# Patient Record
Sex: MALE | Race: WHITE | NOT HISPANIC OR LATINO | URBAN - METROPOLITAN AREA
[De-identification: names, ages, dates, MRNs, and addresses within clinical notes are randomized per-mention and may not be internally consistent; named-entity substitution may affect disease eponyms.]

---

## 2023-01-19 ENCOUNTER — OFFICE VISIT (OUTPATIENT)
Dept: FAMILY MEDICINE CLINIC | Facility: CLINIC | Age: 28
End: 2023-01-19
Payer: MEDICAID

## 2023-01-19 VITALS
DIASTOLIC BLOOD PRESSURE: 79 MMHG | WEIGHT: 226 LBS | SYSTOLIC BLOOD PRESSURE: 121 MMHG | OXYGEN SATURATION: 96 % | TEMPERATURE: 96.8 F | HEART RATE: 75 BPM | BODY MASS INDEX: 32.35 KG/M2 | HEIGHT: 70 IN

## 2023-01-19 DIAGNOSIS — R53.83 OTHER FATIGUE: ICD-10-CM

## 2023-01-19 DIAGNOSIS — Z13.220 LIPID SCREENING: ICD-10-CM

## 2023-01-19 DIAGNOSIS — E66.09 CLASS 1 OBESITY DUE TO EXCESS CALORIES WITHOUT SERIOUS COMORBIDITY WITH BODY MASS INDEX (BMI) OF 32.0 TO 32.9 IN ADULT: ICD-10-CM

## 2023-01-19 DIAGNOSIS — R41.0 CONFUSION: ICD-10-CM

## 2023-01-19 DIAGNOSIS — Z00.00 PREVENTATIVE HEALTH CARE: Primary | ICD-10-CM

## 2023-01-19 DIAGNOSIS — R41.89 ALTERED THOUGHT PROCESSES: ICD-10-CM

## 2023-01-19 PROBLEM — F41.9 ANXIETY: Status: ACTIVE | Noted: 2023-01-19

## 2023-01-19 PROBLEM — K21.9 GASTROESOPHAGEAL REFLUX DISEASE WITHOUT ESOPHAGITIS: Status: ACTIVE | Noted: 2023-01-19

## 2023-01-19 PROBLEM — E66.811 CLASS 1 OBESITY DUE TO EXCESS CALORIES WITHOUT SERIOUS COMORBIDITY WITH BODY MASS INDEX (BMI) OF 32.0 TO 32.9 IN ADULT: Status: ACTIVE | Noted: 2023-01-19

## 2023-01-19 PROBLEM — K58.2 IRRITABLE BOWEL SYNDROME WITH BOTH CONSTIPATION AND DIARRHEA: Status: ACTIVE | Noted: 2023-01-19

## 2023-01-19 PROBLEM — K25.9 MULTIPLE GASTRIC ULCERS: Status: ACTIVE | Noted: 2023-01-19

## 2023-01-19 PROCEDURE — T1015 CLINIC SERVICE: HCPCS | Performed by: NURSE PRACTITIONER

## 2023-01-19 PROCEDURE — 99203 OFFICE O/P NEW LOW 30 MIN: CPT | Performed by: NURSE PRACTITIONER

## 2023-01-19 RX ORDER — LEVOCETIRIZINE DIHYDROCHLORIDE 5 MG/1
5 TABLET, FILM COATED ORAL EVERY EVENING
COMMUNITY

## 2023-01-19 RX ORDER — PAROXETINE HYDROCHLORIDE 20 MG/1
20 TABLET, FILM COATED ORAL DAILY
COMMUNITY
Start: 2023-01-10

## 2023-01-19 RX ORDER — HYDROXYZINE 50 MG/1
50 TABLET, FILM COATED ORAL 2 TIMES DAILY PRN
COMMUNITY
Start: 2022-10-30

## 2023-01-19 RX ORDER — PANTOPRAZOLE SODIUM 40 MG/1
40 TABLET, DELAYED RELEASE ORAL DAILY
COMMUNITY
Start: 2022-12-23

## 2023-01-19 NOTE — PATIENT INSTRUCTIONS
CT of the head without  Fasting blood work  Stop smoking marijuana  Try hydroxyzine at bedtime for sleep  Follow-up 3 months

## 2023-01-19 NOTE — PROGRESS NOTES
Raj Cabrera is a 27 y.o. male.     History of Present Illness  27-year-old white male who recently quit smoking with history of GERD, anxiety and insomnia who comes in today to be established as a new patient    Blood pressure 120/78 heart rate 74 he denies any chest pain, dyspnea, tachycardia or dizziness.  Patient has a very small diastolic murmur which was difficult to hear    Patient states about 3 weeks ago he got sick for several days but did not get tested.  He states since then he has had issues with memory.  He goes to say 1 word in a different word comes out and he states he is just fuzzyheaded and no something is not right.  He also complains of still having fatigue.  We will get a CT of the head to make sure there is nothing neurological going on.  He is on Paxil which she does not always take on a regular basis however he has been doing that for the past year with no symptoms so that should not really be an influence here.  Patient has had 1 COVID-vaccine    Patient states he was on Paxil and hydroxyzine for anxiety but he does not really feel like he has anxiety anymore and he does not take the Paxil every day anyway so he wants to try to wean off but I recommended we wait till we find out what is going on with these mental status changes first.  He does admit to smoking marijuana every night to help him sleep.  I informed him of the dangers of regular marijuana as far as early dementia etc.  I suggested he try taking hydroxyzine at bedtime instead    Weight is 226 with a BMI of 32.4.  He has had 1 COVID vaccines but has not had an eye exam in 10 years I advised him to get 1            CT of the head without  Fasting blood work  Stop smoking marijuana  Try hydroxyzine at bedtime for sleep  Follow-up 3 months           The following portions of the patient's history were reviewed and updated as appropriate: allergies, current medications, past family history, past medical history, past  "social history, past surgical history and problem list.    Vitals:    01/19/23 1044   BP: 121/79   BP Location: Right arm   Patient Position: Sitting   Cuff Size: Large Adult   Pulse: 75   Temp: 96.8 °F (36 °C)   TempSrc: Temporal   SpO2: 96%   Weight: 103 kg (226 lb)   Height: 177.8 cm (70\")     Body mass index is 32.43 kg/m².    Past Medical History:   Diagnosis Date   • Anxiety    • IBS (irritable bowel syndrome)    • Stomach ulcer      Past Surgical History:   Procedure Laterality Date   • ENDOSCOPY       Family History   Problem Relation Age of Onset   • Hypertension Mother    • Heart disease Father      Immunization History   Administered Date(s) Administered   • COVID-19 (MICHAEL) 05/08/2021   • DTP 1994, 01/11/1996, 10/17/1996   • DTaP 09/13/1999   • Flu Vaccine Quad PF 6-35MO 01/07/2020   • Hep B, Adolescent or Pediatric 1994, 1994, 01/11/1996   • Hib (HbOC) 1994, 01/11/1996   • Hib (PRP-T) 10/17/1996   • MMR 01/11/1996, 09/13/1999   • OPV 1994, 01/11/1996, 09/13/1999   • Tdap 11/12/2017   • Varicella 01/21/1998, 09/13/1999       No results found for any previous visit.         Review of Systems   Constitutional: Positive for fatigue.   HENT: Negative.    Respiratory: Negative.    Cardiovascular: Negative.    Gastrointestinal: Negative.    Musculoskeletal: Negative.    Skin: Negative.    Neurological: Positive for memory problem and confusion.   Psychiatric/Behavioral: Positive for sleep disturbance. The patient is nervous/anxious.        Objective   Physical Exam  Constitutional:       Appearance: Normal appearance.   HENT:      Head: Normocephalic.   Cardiovascular:      Rate and Rhythm: Normal rate and regular rhythm.      Pulses: Normal pulses.      Heart sounds: Murmur heard.      Comments: Possible small diastolic murmur difficult to hear  Pulmonary:      Effort: Pulmonary effort is normal.      Breath sounds: Normal breath sounds.   Abdominal:      General: Bowel sounds " are normal.   Musculoskeletal:         General: Normal range of motion.   Skin:     General: Skin is warm and dry.   Neurological:      General: No focal deficit present.      Mental Status: He is alert and oriented to person, place, and time.      Comments: Patient states he feels very foggy.   making comments he normally would never make having trouble recalling certain words or saying the wrong word which has been going on for 2 to 3 weeks   Psychiatric:         Mood and Affect: Mood normal.         Behavior: Behavior normal.         Procedures    Assessment & Plan   Diagnoses and all orders for this visit:    1. Preventative health care (Primary)  -     CBC & Differential  -     Comprehensive Metabolic Panel    2. Lipid screening  -     Lipid Panel With LDL / HDL Ratio    3. Other fatigue  -     TSH+Free T4  -     T3  -     Vitamin B12    4. Confusion  -     CT Head Without Contrast; Future    5. Altered thought processes  -     CT Head Without Contrast; Future    6. Class 1 obesity due to excess calories without serious comorbidity with body mass index (BMI) of 32.0 to 32.9 in adult          Current Outpatient Medications:   •  hydrOXYzine (ATARAX) 50 MG tablet, Take 50 mg by mouth 2 (Two) Times a Day As Needed., Disp: , Rfl:   •  levocetirizine (XYZAL) 5 MG tablet, Take 5 mg by mouth Every Evening., Disp: , Rfl:   •  pantoprazole (PROTONIX) 40 MG EC tablet, Take 40 mg by mouth Daily., Disp: , Rfl:   •  PARoxetine (PAXIL) 20 MG tablet, Take 20 mg by mouth Daily., Disp: , Rfl:            MARISOL Yates 1/19/2023 11:16 EST  This note has been electronically signed

## 2023-01-20 LAB
ALBUMIN SERPL-MCNC: 4.6 G/DL (ref 4.1–5.2)
ALBUMIN/GLOB SERPL: 2.1 {RATIO} (ref 1.2–2.2)
ALP SERPL-CCNC: 80 IU/L (ref 44–121)
ALT SERPL-CCNC: 30 IU/L (ref 0–44)
AST SERPL-CCNC: 20 IU/L (ref 0–40)
BASOPHILS # BLD AUTO: 0 X10E3/UL (ref 0–0.2)
BASOPHILS NFR BLD AUTO: 0 %
BILIRUB SERPL-MCNC: 0.2 MG/DL (ref 0–1.2)
BUN SERPL-MCNC: 12 MG/DL (ref 6–20)
BUN/CREAT SERPL: 12 (ref 9–20)
CALCIUM SERPL-MCNC: 9.5 MG/DL (ref 8.7–10.2)
CHLORIDE SERPL-SCNC: 106 MMOL/L (ref 96–106)
CHOLEST SERPL-MCNC: 195 MG/DL (ref 100–199)
CO2 SERPL-SCNC: 27 MMOL/L (ref 20–29)
CREAT SERPL-MCNC: 1.02 MG/DL (ref 0.76–1.27)
EGFRCR SERPLBLD CKD-EPI 2021: 103 ML/MIN/1.73
EOSINOPHIL # BLD AUTO: 0.1 X10E3/UL (ref 0–0.4)
EOSINOPHIL NFR BLD AUTO: 2 %
ERYTHROCYTE [DISTWIDTH] IN BLOOD BY AUTOMATED COUNT: 13 % (ref 11.6–15.4)
GLOBULIN SER CALC-MCNC: 2.2 G/DL (ref 1.5–4.5)
GLUCOSE SERPL-MCNC: 113 MG/DL (ref 70–99)
HCT VFR BLD AUTO: 52.3 % (ref 37.5–51)
HDLC SERPL-MCNC: 37 MG/DL
HGB BLD-MCNC: 17.5 G/DL (ref 13–17.7)
IMM GRANULOCYTES # BLD AUTO: 0 X10E3/UL (ref 0–0.1)
IMM GRANULOCYTES NFR BLD AUTO: 0 %
LDLC SERPL CALC-MCNC: 131 MG/DL (ref 0–99)
LDLC/HDLC SERPL: 3.5 RATIO (ref 0–3.6)
LYMPHOCYTES # BLD AUTO: 1.9 X10E3/UL (ref 0.7–3.1)
LYMPHOCYTES NFR BLD AUTO: 26 %
MCH RBC QN AUTO: 30 PG (ref 26.6–33)
MCHC RBC AUTO-ENTMCNC: 33.5 G/DL (ref 31.5–35.7)
MCV RBC AUTO: 90 FL (ref 79–97)
MONOCYTES # BLD AUTO: 0.5 X10E3/UL (ref 0.1–0.9)
MONOCYTES NFR BLD AUTO: 7 %
NEUTROPHILS # BLD AUTO: 4.8 X10E3/UL (ref 1.4–7)
NEUTROPHILS NFR BLD AUTO: 65 %
PLATELET # BLD AUTO: 236 X10E3/UL (ref 150–450)
POTASSIUM SERPL-SCNC: 5 MMOL/L (ref 3.5–5.2)
PROT SERPL-MCNC: 6.8 G/DL (ref 6–8.5)
RBC # BLD AUTO: 5.84 X10E6/UL (ref 4.14–5.8)
SODIUM SERPL-SCNC: 144 MMOL/L (ref 134–144)
T3 SERPL-MCNC: 123 NG/DL (ref 71–180)
T4 FREE SERPL-MCNC: 1.37 NG/DL (ref 0.82–1.77)
TRIGL SERPL-MCNC: 148 MG/DL (ref 0–149)
TSH SERPL DL<=0.005 MIU/L-ACNC: 2.87 UIU/ML (ref 0.45–4.5)
VIT B12 SERPL-MCNC: 710 PG/ML (ref 232–1245)
VLDLC SERPL CALC-MCNC: 27 MG/DL (ref 5–40)
WBC # BLD AUTO: 7.4 X10E3/UL (ref 3.4–10.8)

## 2023-05-09 ENCOUNTER — OFFICE VISIT (OUTPATIENT)
Dept: FAMILY MEDICINE CLINIC | Facility: CLINIC | Age: 28
End: 2023-05-09
Payer: MEDICAID

## 2023-05-09 VITALS
OXYGEN SATURATION: 97 % | DIASTOLIC BLOOD PRESSURE: 79 MMHG | TEMPERATURE: 97.3 F | HEART RATE: 70 BPM | SYSTOLIC BLOOD PRESSURE: 122 MMHG | HEIGHT: 70 IN | BODY MASS INDEX: 30.41 KG/M2 | WEIGHT: 212.4 LBS

## 2023-05-09 DIAGNOSIS — K21.9 GASTROESOPHAGEAL REFLUX DISEASE WITHOUT ESOPHAGITIS: ICD-10-CM

## 2023-05-09 DIAGNOSIS — F41.9 ANXIETY: ICD-10-CM

## 2023-05-09 DIAGNOSIS — E78.00 ELEVATED LDL CHOLESTEROL LEVEL: Primary | ICD-10-CM

## 2023-05-09 DIAGNOSIS — E66.09 CLASS 1 OBESITY DUE TO EXCESS CALORIES WITHOUT SERIOUS COMORBIDITY WITH BODY MASS INDEX (BMI) OF 30.0 TO 30.9 IN ADULT: ICD-10-CM

## 2023-05-09 DIAGNOSIS — R73.09 ELEVATED GLUCOSE: ICD-10-CM

## 2023-05-09 DIAGNOSIS — Z00.00 PREVENTATIVE HEALTH CARE: ICD-10-CM

## 2023-05-09 PROBLEM — E66.811 CLASS 1 OBESITY DUE TO EXCESS CALORIES WITHOUT SERIOUS COMORBIDITY WITH BODY MASS INDEX (BMI) OF 30.0 TO 30.9 IN ADULT: Status: ACTIVE | Noted: 2023-05-09

## 2023-05-09 NOTE — PROGRESS NOTES
Raj Cabrera is a 28 y.o. male.     History of Present Illness    48-year-old white male ex-smoker with history of GERD, anxiety and insomnia comes in today for follow-up visit    Blood pressure 122/78 heart rate 70 he denies any chest pain, dyspnea, tachycardia or dizziness    Patient states he just got tired of feeling bad all the time and quit cigarettes, vaping, marijuana and alcohol.  He also has been dieting and eating properly and states he feels 100% better.  He wants to go off his anxiety medication and his stomach medication as well.  He has only been eating once a day and I advised him to try to eat something small and low-fat every 2 to 3 hours to keep something in his stomach so we can try to wean off of the PPI.  Since he has done this all at one time I advised him to stay on the Paxil for 1 more month and if he still feels good I gave him instructions on how to wean off of that.    He has lost 14 pounds with a weight of 212 and a BMI of 30.5.  His weight goal is 180 pounds which would give him a BMI of 25.  Patient states he is sleeping better, can smell again, memory has returned and circulation in his hands is much better.    He has had 1 COVID-vaccine but has not had eye exam since he was 7 years old and I encouraged him to do so          Fasting blood work  Continue diet and exercise  Wean off Prilosec as possible  Try eating something small every 2-3 hours to keep something in your stomach  After 1 month if you are still feeling good try weaning off of Paxil as directed during our visit today  Follow-up 3 - 6 months       The following portions of the patient's history were reviewed and updated as appropriate: allergies, current medications, past family history, past medical history, past social history, past surgical history and problem list.    Vitals:    05/09/23 1254   BP: 122/79   BP Location: Right arm   Patient Position: Sitting   Cuff Size: Large Adult   Pulse: 70   Temp: 97.3  "°F (36.3 °C)   TempSrc: Temporal   SpO2: 97%   Weight: 96.3 kg (212 lb 6.4 oz)   Height: 177.8 cm (70\")     Body mass index is 30.48 kg/m².    Past Medical History:   Diagnosis Date   • Allergic     Year round allergies   • Anxiety    • IBS (irritable bowel syndrome)    • Stomach ulcer      Past Surgical History:   Procedure Laterality Date   • ENDOSCOPY     • SINUS SURGERY       Family History   Problem Relation Age of Onset   • Hypertension Mother    • Cancer Mother         Bladder cancer (operable)   • Heart disease Father      Immunization History   Administered Date(s) Administered   • COVID-19 (MICHAEL) 05/08/2021   • DTP 1994, 01/11/1996, 10/17/1996   • DTaP 09/13/1999   • Flu Vaccine Quad PF 6-35MO 01/07/2020   • Hep B, Adolescent or Pediatric 1994, 1994, 01/11/1996   • Hib (HbOC) 1994, 01/11/1996   • Hib (PRP-T) 10/17/1996   • MMR 01/11/1996, 09/13/1999   • OPV 1994, 01/11/1996, 09/13/1999   • Tdap 11/12/2017   • Varicella 01/21/1998, 09/13/1999       Office Visit on 01/19/2023   Component Date Value Ref Range Status   • WBC 01/19/2023 7.4  3.4 - 10.8 x10E3/uL Final   • RBC 01/19/2023 5.84 (H)  4.14 - 5.80 x10E6/uL Final   • Hemoglobin 01/19/2023 17.5  13.0 - 17.7 g/dL Final   • Hematocrit 01/19/2023 52.3 (H)  37.5 - 51.0 % Final   • MCV 01/19/2023 90  79 - 97 fL Final   • MCH 01/19/2023 30.0  26.6 - 33.0 pg Final   • MCHC 01/19/2023 33.5  31.5 - 35.7 g/dL Final   • RDW 01/19/2023 13.0  11.6 - 15.4 % Final   • Platelets 01/19/2023 236  150 - 450 x10E3/uL Final   • Neutrophil Rel % 01/19/2023 65  Not Estab. % Final   • Lymphocyte Rel % 01/19/2023 26  Not Estab. % Final   • Monocyte Rel % 01/19/2023 7  Not Estab. % Final   • Eosinophil Rel % 01/19/2023 2  Not Estab. % Final   • Basophil Rel % 01/19/2023 0  Not Estab. % Final   • Neutrophils Absolute 01/19/2023 4.8  1.4 - 7.0 x10E3/uL Final   • Lymphocytes Absolute 01/19/2023 1.9  0.7 - 3.1 x10E3/uL Final   • Monocytes Absolute " 01/19/2023 0.5  0.1 - 0.9 x10E3/uL Final   • Eosinophils Absolute 01/19/2023 0.1  0.0 - 0.4 x10E3/uL Final   • Basophils Absolute 01/19/2023 0.0  0.0 - 0.2 x10E3/uL Final   • Immature Granulocyte Rel % 01/19/2023 0  Not Estab. % Final   • Immature Grans Absolute 01/19/2023 0.0  0.0 - 0.1 x10E3/uL Final   • Glucose 01/19/2023 113 (H)  70 - 99 mg/dL Final   • BUN 01/19/2023 12  6 - 20 mg/dL Final   • Creatinine 01/19/2023 1.02  0.76 - 1.27 mg/dL Final   • EGFR Result 01/19/2023 103  >59 mL/min/1.73 Final   • BUN/Creatinine Ratio 01/19/2023 12  9 - 20 Final   • Sodium 01/19/2023 144  134 - 144 mmol/L Final   • Potassium 01/19/2023 5.0  3.5 - 5.2 mmol/L Final   • Chloride 01/19/2023 106  96 - 106 mmol/L Final   • Total CO2 01/19/2023 27  20 - 29 mmol/L Final   • Calcium 01/19/2023 9.5  8.7 - 10.2 mg/dL Final   • Total Protein 01/19/2023 6.8  6.0 - 8.5 g/dL Final   • Albumin 01/19/2023 4.6  4.1 - 5.2 g/dL Final   • Globulin 01/19/2023 2.2  1.5 - 4.5 g/dL Final   • A/G Ratio 01/19/2023 2.1  1.2 - 2.2 Final   • Total Bilirubin 01/19/2023 0.2  0.0 - 1.2 mg/dL Final   • Alkaline Phosphatase 01/19/2023 80  44 - 121 IU/L Final   • AST (SGOT) 01/19/2023 20  0 - 40 IU/L Final   • ALT (SGPT) 01/19/2023 30  0 - 44 IU/L Final   • Total Cholesterol 01/19/2023 195  100 - 199 mg/dL Final   • Triglycerides 01/19/2023 148  0 - 149 mg/dL Final   • HDL Cholesterol 01/19/2023 37 (L)  >39 mg/dL Final   • VLDL Cholesterol [place]ron 01/19/2023 27  5 - 40 mg/dL Final   • LDL Chol Calc ([facility]) 01/19/2023 131 (H)  0 - 99 mg/dL Final   • LDL/HDL RATIO 01/19/2023 3.5  0.0 - 3.6 ratio Final    Comment:                                     LDL/HDL Ratio                                              Men  Women                                1/2 Avg.Risk  1.0    1.5                                    Avg.Risk  3.6    3.2                                 2X Avg.Risk  6.2    5.0                                 3X Avg.Risk  8.0    6.1     • TSH 01/19/2023 2.870   0.450 - 4.500 uIU/mL Final   • Free T4 01/19/2023 1.37  0.82 - 1.77 ng/dL Final   • T3, Total 01/19/2023 123  71 - 180 ng/dL Final   • Vitamin B-12 01/19/2023 710  232 - 1,245 pg/mL Final         Review of Systems   Constitutional: Negative.    HENT: Negative.    Respiratory: Negative.    Cardiovascular: Negative.    Gastrointestinal: Negative.    Genitourinary: Negative.    Musculoskeletal: Negative.    Skin: Negative.    Neurological: Negative.    Psychiatric/Behavioral: Negative.        Objective   Physical Exam  Constitutional:       Appearance: Normal appearance.   Cardiovascular:      Rate and Rhythm: Normal rate and regular rhythm.      Pulses: Normal pulses.      Heart sounds: Normal heart sounds.   Pulmonary:      Effort: Pulmonary effort is normal.      Breath sounds: Normal breath sounds.   Abdominal:      General: Bowel sounds are normal.   Musculoskeletal:         General: Normal range of motion.   Skin:     General: Skin is warm and dry.   Neurological:      General: No focal deficit present.      Mental Status: He is alert and oriented to person, place, and time.   Psychiatric:         Mood and Affect: Mood normal.         Behavior: Behavior normal.         Procedures    Assessment & Plan   Diagnoses and all orders for this visit:    1. Elevated LDL cholesterol level (Primary)  -     Lipid Panel With LDL / HDL Ratio; Future    2. Elevated glucose  -     Comprehensive Metabolic Panel; Future    3. Preventative health care  -     CBC & Differential; Future    4. Class 1 obesity due to excess calories without serious comorbidity with body mass index (BMI) of 30.0 to 30.9 in adult    5. Gastroesophageal reflux disease without esophagitis    6. Anxiety          Current Outpatient Medications:   •  hydrOXYzine (ATARAX) 50 MG tablet, Take 1 tablet by mouth 2 (Two) Times a Day As Needed., Disp: , Rfl:   •  levocetirizine (XYZAL) 5 MG tablet, Take 1 tablet by mouth Every Evening., Disp: , Rfl:   •   pantoprazole (PROTONIX) 40 MG EC tablet, Take 1 tablet by mouth Daily., Disp: , Rfl:   •  PARoxetine (PAXIL) 20 MG tablet, Take 1 tablet by mouth Daily., Disp: , Rfl:            Erin Harrison, APRN 5/9/2023 13:34 EDT  This note has been electronically signed

## 2023-05-09 NOTE — PATIENT INSTRUCTIONS
Fasting blood work  Continue diet and exercise  Wean off Prilosec as possible  Try eating something small every 2-3 hours to keep something in your stomach  After 1 month if you are still feeling good try weaning off of Paxil as directed during our visit today  Follow-up 3 6 months

## 2023-05-11 LAB
ALBUMIN SERPL-MCNC: 4.6 G/DL (ref 4.1–5.2)
ALBUMIN/GLOB SERPL: 2.3 {RATIO} (ref 1.2–2.2)
ALP SERPL-CCNC: 75 IU/L (ref 44–121)
ALT SERPL-CCNC: 18 IU/L (ref 0–44)
AST SERPL-CCNC: 19 IU/L (ref 0–40)
BASOPHILS # BLD AUTO: 0 X10E3/UL (ref 0–0.2)
BASOPHILS NFR BLD AUTO: 0 %
BILIRUB SERPL-MCNC: 0.4 MG/DL (ref 0–1.2)
BUN SERPL-MCNC: 14 MG/DL (ref 6–20)
BUN/CREAT SERPL: 13 (ref 9–20)
CALCIUM SERPL-MCNC: 9.1 MG/DL (ref 8.7–10.2)
CHLORIDE SERPL-SCNC: 103 MMOL/L (ref 96–106)
CHOLEST SERPL-MCNC: 171 MG/DL (ref 100–199)
CO2 SERPL-SCNC: 22 MMOL/L (ref 20–29)
CREAT SERPL-MCNC: 1.1 MG/DL (ref 0.76–1.27)
EGFRCR SERPLBLD CKD-EPI 2021: 93 ML/MIN/1.73
EOSINOPHIL # BLD AUTO: 0.1 X10E3/UL (ref 0–0.4)
EOSINOPHIL NFR BLD AUTO: 1 %
ERYTHROCYTE [DISTWIDTH] IN BLOOD BY AUTOMATED COUNT: 12.9 % (ref 11.6–15.4)
GLOBULIN SER CALC-MCNC: 2 G/DL (ref 1.5–4.5)
GLUCOSE SERPL-MCNC: 92 MG/DL (ref 70–99)
HCT VFR BLD AUTO: 48.6 % (ref 37.5–51)
HDLC SERPL-MCNC: 31 MG/DL
HGB BLD-MCNC: 16.7 G/DL (ref 13–17.7)
IMM GRANULOCYTES # BLD AUTO: 0 X10E3/UL (ref 0–0.1)
IMM GRANULOCYTES NFR BLD AUTO: 0 %
LDLC SERPL CALC-MCNC: 124 MG/DL (ref 0–99)
LDLC/HDLC SERPL: 4 RATIO (ref 0–3.6)
LYMPHOCYTES # BLD AUTO: 1.8 X10E3/UL (ref 0.7–3.1)
LYMPHOCYTES NFR BLD AUTO: 32 %
MCH RBC QN AUTO: 30.9 PG (ref 26.6–33)
MCHC RBC AUTO-ENTMCNC: 34.4 G/DL (ref 31.5–35.7)
MCV RBC AUTO: 90 FL (ref 79–97)
MONOCYTES # BLD AUTO: 0.6 X10E3/UL (ref 0.1–0.9)
MONOCYTES NFR BLD AUTO: 10 %
NEUTROPHILS # BLD AUTO: 3.1 X10E3/UL (ref 1.4–7)
NEUTROPHILS NFR BLD AUTO: 57 %
PLATELET # BLD AUTO: 202 X10E3/UL (ref 150–450)
POTASSIUM SERPL-SCNC: 4.3 MMOL/L (ref 3.5–5.2)
PROT SERPL-MCNC: 6.6 G/DL (ref 6–8.5)
RBC # BLD AUTO: 5.4 X10E6/UL (ref 4.14–5.8)
SODIUM SERPL-SCNC: 141 MMOL/L (ref 134–144)
TRIGL SERPL-MCNC: 83 MG/DL (ref 0–149)
VLDLC SERPL CALC-MCNC: 16 MG/DL (ref 5–40)
WBC # BLD AUTO: 5.6 X10E3/UL (ref 3.4–10.8)

## 2023-06-12 RX ORDER — PANTOPRAZOLE SODIUM 40 MG/1
40 TABLET, DELAYED RELEASE ORAL DAILY
Qty: 90 TABLET | Refills: 1 | Status: SHIPPED | OUTPATIENT
Start: 2023-06-12

## 2023-06-12 NOTE — TELEPHONE ENCOUNTER
Caller: Raj Cabrera    Relationship: Self    Best call back number: [phone]     Requested Prescriptions:   Requested Prescriptions     Pending Prescriptions Disp Refills    pantoprazole (PROTONIX) 40 MG EC tablet       Sig: Take 1 tablet by mouth Daily.        Pharmacy where request should be sent: [address], [facility] [phone] [facility] [phone]      Last office visit with prescribing clinician: 5/9/2023   Last telemedicine visit with prescribing clinician: Visit date not found   Next office visit with prescribing clinician: Visit date not found       Does the patient have less than a 3 day supply:  [x] Yes  [] No    Would you like a call back once the refill request has been completed: [] Yes [] No    If the office needs to give you a call back, can they leave a voicemail: [] Yes [] No    Rohan Richard Rep   06/12/23 10:44 EDT

## 2023-09-07 ENCOUNTER — TELEPHONE (OUTPATIENT)
Dept: FAMILY MEDICINE CLINIC | Facility: CLINIC | Age: 28
End: 2023-09-07
Payer: MEDICAID

## 2023-09-07 NOTE — TELEPHONE ENCOUNTER
Caller: EMILY DELCID    Relationship to patient: Emergency Contact    Best call back number: [phone]    Patient is needing:   pantoprazole (PROTONIX) 40 MG EC tablet       PATIENT WAS INFORMED BY PHARMACY THAT THIS MEDICATION IS NEEDING A PRIOR AUTHORIZATION

## 2023-09-08 RX ORDER — PANTOPRAZOLE SODIUM 40 MG/1
TABLET, DELAYED RELEASE ORAL
Qty: 90 TABLET | Refills: 1 | Status: SHIPPED | OUTPATIENT
Start: 2023-09-08

## 2023-10-12 ENCOUNTER — OFFICE VISIT (OUTPATIENT)
Dept: FAMILY MEDICINE CLINIC | Facility: CLINIC | Age: 28
End: 2023-10-12
Payer: MEDICAID

## 2023-10-12 VITALS
DIASTOLIC BLOOD PRESSURE: 76 MMHG | SYSTOLIC BLOOD PRESSURE: 138 MMHG | TEMPERATURE: 97.3 F | OXYGEN SATURATION: 98 % | BODY MASS INDEX: 31.95 KG/M2 | WEIGHT: 223.2 LBS | HEART RATE: 89 BPM | HEIGHT: 70 IN

## 2023-10-12 DIAGNOSIS — E66.09 CLASS 1 OBESITY DUE TO EXCESS CALORIES WITH SERIOUS COMORBIDITY AND BODY MASS INDEX (BMI) OF 32.0 TO 32.9 IN ADULT: ICD-10-CM

## 2023-10-12 DIAGNOSIS — J30.1 ALLERGIC RHINITIS DUE TO POLLEN, UNSPECIFIED SEASONALITY: ICD-10-CM

## 2023-10-12 DIAGNOSIS — K21.9 GASTROESOPHAGEAL REFLUX DISEASE WITHOUT ESOPHAGITIS: Primary | ICD-10-CM

## 2023-10-12 DIAGNOSIS — F41.9 ANXIETY: ICD-10-CM

## 2023-10-12 DIAGNOSIS — K21.9 GASTROESOPHAGEAL REFLUX DISEASE, UNSPECIFIED WHETHER ESOPHAGITIS PRESENT: ICD-10-CM

## 2023-10-12 DIAGNOSIS — Z72.0 TOBACCO ABUSE: ICD-10-CM

## 2023-10-12 PROBLEM — E66.811 CLASS 1 OBESITY DUE TO EXCESS CALORIES WITH SERIOUS COMORBIDITY AND BODY MASS INDEX (BMI) OF 32.0 TO 32.9 IN ADULT: Status: ACTIVE | Noted: 2023-10-12

## 2023-10-12 PROCEDURE — T1015 CLINIC SERVICE: HCPCS | Performed by: NURSE PRACTITIONER

## 2023-10-12 PROCEDURE — 99213 OFFICE O/P EST LOW 20 MIN: CPT | Performed by: NURSE PRACTITIONER

## 2023-10-12 RX ORDER — PAROXETINE HYDROCHLORIDE 20 MG/1
20 TABLET, FILM COATED ORAL DAILY
Qty: 90 TABLET | Refills: 1 | Status: SHIPPED | OUTPATIENT
Start: 2023-10-12

## 2023-10-12 RX ORDER — HYDROXYZINE 50 MG/1
50 TABLET, FILM COATED ORAL 2 TIMES DAILY PRN
Qty: 180 TABLET | Refills: 1 | Status: SHIPPED | OUTPATIENT
Start: 2023-10-12

## 2023-10-12 RX ORDER — PANTOPRAZOLE SODIUM 40 MG/1
40 TABLET, DELAYED RELEASE ORAL DAILY
Qty: 90 TABLET | Refills: 1 | Status: SHIPPED | OUTPATIENT
Start: 2023-10-12

## 2023-10-12 RX ORDER — LEVOCETIRIZINE DIHYDROCHLORIDE 5 MG/1
5 TABLET, FILM COATED ORAL EVERY EVENING
Qty: 90 TABLET | Refills: 1 | Status: SHIPPED | OUTPATIENT
Start: 2023-10-12

## 2023-10-12 NOTE — PATIENT INSTRUCTIONS
Fasting visit in January  Diet and exercise monitor weight closely  Schedule eye exam  Stop smoking  Follow-up fasting in January

## 2023-10-12 NOTE — PROGRESS NOTES
"    Raj Cabrera is a 28 y.o. male.     History of Present Illness  28-year-old white male smoker with history of GERD, anxiety and insomnia who comes in today for follow-up visit    Blood pressure 138/76 heart rate 88 he denies any chest pain, dyspnea, tachycardia or dizziness    On last visit or down on Prilosec but states he was not able to probably because he began smoking again.  He was also not able to wean off Paxil but he states he feels good on the medications he is currently on    We also talked about diet and exercise however patient has gained 11 pounds with a weight of 223 and a BMI of 32.0 and I cautioned him to watch his weight closely    He has had 1 COVID-vaccine still need to schedule an eye exam and we will do fasting labs in 6 months            Fasting visit in January  Diet and exercise monitor weight closely  Schedule eye exam  Stop smoking  Follow-up fasting in January       The following portions of the patient's history were reviewed and updated as appropriate: allergies, current medications, past family history, past medical history, past social history, past surgical history, and problem list.    Vitals:    10/12/23 1304   BP: 138/76   BP Location: Right arm   Patient Position: Sitting   Cuff Size: Adult   Pulse: 89   Temp: 97.3 øF (36.3 øC)   TempSrc: Infrared   SpO2: 98%   Weight: 101 kg (223 lb 3.2 oz)   Height: 177.8 cm (70\")     Body mass index is 32.03 kg/mý.    Past Medical History:   Diagnosis Date    Allergic     Year round allergies    Anxiety     IBS (irritable bowel syndrome)     Stomach ulcer      Past Surgical History:   Procedure Laterality Date    ENDOSCOPY      SINUS SURGERY       Family History   Problem Relation Age of Onset    Hypertension Mother     Cancer Mother         Bladder cancer (operable)    Heart disease Father      Immunization History   Administered Date(s) Administered    COVID-19 (MICHAEL) 05/08/2021    DTP 1994, 01/11/1996, 10/17/1996    DTaP " 09/13/1999    Flu Vaccine Quad PF 6-35MO 01/07/2020    Hep B, Adolescent or Pediatric 1994, 1994, 01/11/1996    Hib (HbOC) 1994, 01/11/1996    Hib (PRP-T) 10/17/1996    MMR 01/11/1996, 09/13/1999    OPV 1994, 01/11/1996, 09/13/1999    Tdap 11/12/2017    Varicella 01/21/1998, 09/13/1999       Office Visit on 05/09/2023   Component Date Value Ref Range Status    Total Cholesterol 05/10/2023 171  100 - 199 mg/dL Final    Triglycerides 05/10/2023 83  0 - 149 mg/dL Final    HDL Cholesterol 05/10/2023 31 (L)  >39 mg/dL Final    VLDL Cholesterol Kamron 05/10/2023 16  5 - 40 mg/dL Final    LDL Chol Calc ([facility]) 05/10/2023 124 (H)  0 - 99 mg/dL Final    LDL/HDL RATIO 05/10/2023 4.0 (H)  0.0 - 3.6 ratio Final    Comment:                                     LDL/HDL Ratio                                              Men  Women                                1/2 Avg.Risk  1.0    1.5                                    Avg.Risk  3.6    3.2                                 2X Avg.Risk  6.2    5.0                                 3X Avg.Risk  8.0    6.1      Glucose 05/10/2023 92  70 - 99 mg/dL Final    BUN 05/10/2023 14  6 - 20 mg/dL Final    Creatinine 05/10/2023 1.10  0.76 - 1.27 mg/dL Final    EGFR Result 05/10/2023 93  >59 mL/min/1.73 Final    BUN/Creatinine Ratio 05/10/2023 13  9 - 20 Final    Sodium 05/10/2023 141  134 - 144 mmol/L Final    Potassium 05/10/2023 4.3  3.5 - 5.2 mmol/L Final    Chloride 05/10/2023 103  96 - 106 mmol/L Final    Total CO2 05/10/2023 22  20 - 29 mmol/L Final    Calcium 05/10/2023 9.1  8.7 - 10.2 mg/dL Final    Total Protein 05/10/2023 6.6  6.0 - 8.5 g/dL Final    Albumin 05/10/2023 4.6  4.1 - 5.2 g/dL Final    Globulin 05/10/2023 2.0  1.5 - 4.5 g/dL Final    A/G Ratio 05/10/2023 2.3 (H)  1.2 - 2.2 Final    Total Bilirubin 05/10/2023 0.4  0.0 - 1.2 mg/dL Final    Alkaline Phosphatase 05/10/2023 75  44 - 121 IU/L Final    AST (SGOT) 05/10/2023 19  0 - 40 IU/L Final    ALT (SGPT)  05/10/2023 18  0 - 44 IU/L Final    WBC 05/10/2023 5.6  3.4 - 10.8 x10E3/uL Final    RBC 05/10/2023 5.40  4.14 - 5.80 x10E6/uL Final    Hemoglobin 05/10/2023 16.7  13.0 - 17.7 g/dL Final    Hematocrit 05/10/2023 48.6  37.5 - 51.0 % Final    MCV 05/10/2023 90  79 - 97 fL Final    MCH 05/10/2023 30.9  26.6 - 33.0 pg Final    MCHC 05/10/2023 34.4  31.5 - 35.7 g/dL Final    RDW 05/10/2023 12.9  11.6 - 15.4 % Final    Platelets 05/10/2023 202  150 - 450 x10E3/uL Final    Neutrophil Rel % 05/10/2023 57  Not Estab. % Final    Lymphocyte Rel % 05/10/2023 32  Not Estab. % Final    Monocyte Rel % 05/10/2023 10  Not Estab. % Final    Eosinophil Rel % 05/10/2023 1  Not Estab. % Final    Basophil Rel % 05/10/2023 0  Not Estab. % Final    Neutrophils Absolute 05/10/2023 3.1  1.4 - 7.0 x10E3/uL Final    Lymphocytes Absolute 05/10/2023 1.8  0.7 - 3.1 x10E3/uL Final    Monocytes Absolute 05/10/2023 0.6  0.1 - 0.9 x10E3/uL Final    Eosinophils Absolute 05/10/2023 0.1  0.0 - 0.4 x10E3/uL Final    Basophils Absolute 05/10/2023 0.0  0.0 - 0.2 x10E3/uL Final    Immature Granulocyte Rel % 05/10/2023 0  Not Estab. % Final    Immature Grans Absolute 05/10/2023 0.0  0.0 - 0.1 x10E3/uL Final         Review of Systems   Constitutional: Negative.    HENT: Negative.     Respiratory: Negative.     Cardiovascular: Negative.    Gastrointestinal: Negative.    Genitourinary: Negative.    Musculoskeletal: Negative.    Skin: Negative.    Neurological: Negative.    Psychiatric/Behavioral: Negative.         Objective   Physical Exam  Constitutional:       Appearance: Normal appearance.   HENT:      Head: Normocephalic.   Cardiovascular:      Rate and Rhythm: Normal rate and regular rhythm.      Pulses: Normal pulses.      Heart sounds: Normal heart sounds.   Pulmonary:      Effort: Pulmonary effort is normal.      Breath sounds: Normal breath sounds.   Abdominal:      General: Bowel sounds are normal.   Musculoskeletal:         General: Normal range of  motion.   Skin:     General: Skin is warm.   Neurological:      General: No focal deficit present.      Mental Status: He is alert and oriented to person, place, and time.   Psychiatric:         Mood and Affect: Mood normal.         Behavior: Behavior normal.         Procedures    Assessment & Plan   Diagnoses and all orders for this visit:    1. Gastroesophageal reflux disease without esophagitis (Primary)    2. Anxiety  -     hydrOXYzine (ATARAX) 50 MG tablet; Take 1 tablet by mouth 2 (Two) Times a Day As Needed for Itching or Anxiety.  Dispense: 180 tablet; Refill: 1  -     PARoxetine (PAXIL) 20 MG tablet; Take 1 tablet by mouth Daily.  Dispense: 90 tablet; Refill: 1    3. Gastroesophageal reflux disease, unspecified whether esophagitis present  -     pantoprazole (PROTONIX) 40 MG EC tablet; Take 1 tablet by mouth Daily.  Dispense: 90 tablet; Refill: 1    4. Allergic rhinitis due to pollen, unspecified seasonality  -     levocetirizine (XYZAL) 5 MG tablet; Take 1 tablet by mouth Every Evening.  Dispense: 90 tablet; Refill: 1    5. Class 1 obesity due to excess calories with serious comorbidity and body mass index (BMI) of 32.0 to 32.9 in adult    6. Tobacco abuse           Current Outpatient Medications:     hydrOXYzine (ATARAX) 50 MG tablet, Take 1 tablet by mouth 2 (Two) Times a Day As Needed for Itching or Anxiety., Disp: 180 tablet, Rfl: 1    levocetirizine (XYZAL) 5 MG tablet, Take 1 tablet by mouth Every Evening., Disp: 90 tablet, Rfl: 1    pantoprazole (PROTONIX) 40 MG EC tablet, Take 1 tablet by mouth Daily., Disp: 90 tablet, Rfl: 1    PARoxetine (PAXIL) 20 MG tablet, Take 1 tablet by mouth Daily., Disp: 90 tablet, Rfl: 1           Erin Harrison, APRN 10/12/2023 13:41 EDT  This note has been electronically signed

## 2024-04-16 DIAGNOSIS — F41.9 ANXIETY: ICD-10-CM

## 2024-04-16 DIAGNOSIS — J30.1 ALLERGIC RHINITIS DUE TO POLLEN, UNSPECIFIED SEASONALITY: ICD-10-CM

## 2024-04-16 RX ORDER — LEVOCETIRIZINE DIHYDROCHLORIDE 5 MG/1
5 TABLET, FILM COATED ORAL EVERY EVENING
Qty: 90 TABLET | Refills: 1 | Status: SHIPPED | OUTPATIENT
Start: 2024-04-16

## 2024-04-16 RX ORDER — PAROXETINE HYDROCHLORIDE 20 MG/1
20 TABLET, FILM COATED ORAL DAILY
Qty: 90 TABLET | Refills: 1 | Status: SHIPPED | OUTPATIENT
Start: 2024-04-16

## 2024-04-18 ENCOUNTER — OFFICE VISIT (OUTPATIENT)
Dept: FAMILY MEDICINE CLINIC | Facility: CLINIC | Age: 29
End: 2024-04-18
Payer: MEDICAID

## 2024-04-18 VITALS
TEMPERATURE: 97.7 F | SYSTOLIC BLOOD PRESSURE: 121 MMHG | HEIGHT: 70 IN | WEIGHT: 222.6 LBS | HEART RATE: 86 BPM | DIASTOLIC BLOOD PRESSURE: 89 MMHG | RESPIRATION RATE: 18 BRPM | OXYGEN SATURATION: 96 % | BODY MASS INDEX: 31.87 KG/M2

## 2024-04-18 DIAGNOSIS — F41.9 ANXIETY: ICD-10-CM

## 2024-04-18 DIAGNOSIS — Z72.0 TOBACCO ABUSE: Primary | ICD-10-CM

## 2024-04-18 DIAGNOSIS — N52.9 ERECTILE DYSFUNCTION, UNSPECIFIED ERECTILE DYSFUNCTION TYPE: ICD-10-CM

## 2024-04-18 DIAGNOSIS — Z13.220 LIPID SCREENING: ICD-10-CM

## 2024-04-18 DIAGNOSIS — K21.9 GASTROESOPHAGEAL REFLUX DISEASE, UNSPECIFIED WHETHER ESOPHAGITIS PRESENT: ICD-10-CM

## 2024-04-18 DIAGNOSIS — Z00.00 PREVENTATIVE HEALTH CARE: ICD-10-CM

## 2024-04-18 DIAGNOSIS — Z83.3 FAMILY HISTORY OF DIABETES MELLITUS: ICD-10-CM

## 2024-04-18 PROBLEM — N52.8 OTHER MALE ERECTILE DYSFUNCTION: Status: ACTIVE | Noted: 2024-04-18

## 2024-04-18 RX ORDER — PANTOPRAZOLE SODIUM 40 MG/1
40 TABLET, DELAYED RELEASE ORAL DAILY
Qty: 90 TABLET | Refills: 1 | Status: SHIPPED | OUTPATIENT
Start: 2024-04-18

## 2024-04-18 NOTE — PROGRESS NOTES
"    Raj Cabrera is a 28 y.o. male.     History of Present Illness  28-year-old white male who vapes nicotine with history of GERD, anxiety and insomnia comes in today for 6-month follow-up visit fasting blood work    Blood pressure 120/88 heart rate 86 he denies any chest pain, dyspnea, tachycardia or dizziness    Patient's main complaint today is lack of interest and sexual intercourse.  He does work a lot and is really tired when he gets home however he also does an excessive amount of fast food meats that contain hormones.  Will be checking testosterone levels today and advised him to cut back or quit fast foods.  Hopefully this will also help him lose weight    Weight is 223 with a BMI of 31.9.  He had 1 COVID-vaccine and has not had eye exam in years I advised him to get 1.          Fasting blood work  Cut back or stop fast foods  Diet and exercise  Stop vaping  Follow-up 6 months       The following portions of the patient's history were reviewed and updated as appropriate: allergies, current medications, past family history, past medical history, past social history, past surgical history, and problem list.    Vitals:    04/18/24 0820   BP: 121/89   BP Location: Right arm   Patient Position: Sitting   Cuff Size: Large Adult   Pulse: 86   Resp: 18   Temp: 97.7 °F (36.5 °C)   TempSrc: Infrared   SpO2: 96%   Weight: 101 kg (222 lb 9.6 oz)   Height: 177.8 cm (70\")     Body mass index is 31.94 kg/m².    Past Medical History:   Diagnosis Date    Allergic     Year round allergies    Anxiety     GERD (gastroesophageal reflux disease)     IBS (irritable bowel syndrome)     Stomach ulcer      Past Surgical History:   Procedure Laterality Date    ENDOSCOPY      SINUS SURGERY       Family History   Problem Relation Age of Onset    Hypertension Mother     Cancer Mother         Bladder cancer (operable)    Heart disease Father      Immunization History   Administered Date(s) Administered    COVID-19 ([facility]) " 05/08/2021    DTP 1994, 01/11/1996, 10/17/1996    DTaP 09/13/1999    Flu Vaccine Quad PF 6-35MO 01/07/2020    Hep B, Adolescent or Pediatric 1994, 1994, 01/11/1996    Hib (HbOC) 1994, 01/11/1996    Hib (PRP-T) 10/17/1996    MMR 01/11/1996, 09/13/1999    OPV 1994, 01/11/1996, 09/13/1999    Tdap 11/12/2017    Varicella 01/21/1998, 09/13/1999       Office Visit on 05/09/2023   Component Date Value Ref Range Status    Total Cholesterol 05/10/2023 171  100 - 199 mg/dL Final    Triglycerides 05/10/2023 83  0 - 149 mg/dL Final    HDL Cholesterol 05/10/2023 31 (L)  >39 mg/dL Final    VLDL Cholesterol Kamron 05/10/2023 16  5 - 40 mg/dL Final    LDL Chol Calc (NIH) 05/10/2023 124 (H)  0 - 99 mg/dL Final    LDL/HDL RATIO 05/10/2023 4.0 (H)  0.0 - 3.6 ratio Final    Comment:                                     LDL/HDL Ratio                                              Men  Women                                1/2 Avg.Risk  1.0    1.5                                    Avg.Risk  3.6    3.2                                 2X Avg.Risk  6.2    5.0                                 3X Avg.Risk  8.0    6.1      Glucose 05/10/2023 92  70 - 99 mg/dL Final    BUN 05/10/2023 14  6 - 20 mg/dL Final    Creatinine 05/10/2023 1.10  0.76 - 1.27 mg/dL Final    EGFR Result 05/10/2023 93  >59 mL/min/1.73 Final    BUN/Creatinine Ratio 05/10/2023 13  9 - 20 Final    Sodium 05/10/2023 141  134 - 144 mmol/L Final    Potassium 05/10/2023 4.3  3.5 - 5.2 mmol/L Final    Chloride 05/10/2023 103  96 - 106 mmol/L Final    Total CO2 05/10/2023 22  20 - 29 mmol/L Final    Calcium 05/10/2023 9.1  8.7 - 10.2 mg/dL Final    Total Protein 05/10/2023 6.6  6.0 - 8.5 g/dL Final    Albumin 05/10/2023 4.6  4.1 - 5.2 g/dL Final    Globulin 05/10/2023 2.0  1.5 - 4.5 g/dL Final    A/G Ratio 05/10/2023 2.3 (H)  1.2 - 2.2 Final    Total Bilirubin 05/10/2023 0.4  0.0 - 1.2 mg/dL Final    Alkaline Phosphatase 05/10/2023 75  44 - 121 IU/L Final     AST (SGOT) 05/10/2023 19  0 - 40 IU/L Final    ALT (SGPT) 05/10/2023 18  0 - 44 IU/L Final    WBC 05/10/2023 5.6  3.4 - 10.8 x10E3/uL Final    RBC 05/10/2023 5.40  4.14 - 5.80 x10E6/uL Final    Hemoglobin 05/10/2023 16.7  13.0 - 17.7 g/dL Final    Hematocrit 05/10/2023 48.6  37.5 - 51.0 % Final    MCV 05/10/2023 90  79 - 97 fL Final    MCH 05/10/2023 30.9  26.6 - 33.0 pg Final    MCHC 05/10/2023 34.4  31.5 - 35.7 g/dL Final    RDW 05/10/2023 12.9  11.6 - 15.4 % Final    Platelets 05/10/2023 202  150 - 450 x10E3/uL Final    Neutrophil Rel % 05/10/2023 57  Not Estab. % Final    Lymphocyte Rel % 05/10/2023 32  Not Estab. % Final    Monocyte Rel % 05/10/2023 10  Not Estab. % Final    Eosinophil Rel % 05/10/2023 1  Not Estab. % Final    Basophil Rel % 05/10/2023 0  Not Estab. % Final    Neutrophils Absolute 05/10/2023 3.1  1.4 - 7.0 x10E3/uL Final    Lymphocytes Absolute 05/10/2023 1.8  0.7 - 3.1 x10E3/uL Final    Monocytes Absolute 05/10/2023 0.6  0.1 - 0.9 x10E3/uL Final    Eosinophils Absolute 05/10/2023 0.1  0.0 - 0.4 x10E3/uL Final    Basophils Absolute 05/10/2023 0.0  0.0 - 0.2 x10E3/uL Final    Immature Granulocyte Rel % 05/10/2023 0  Not Estab. % Final    Immature Grans Absolute 05/10/2023 0.0  0.0 - 0.1 x10E3/uL Final         Review of Systems   Constitutional: Negative.    HENT: Negative.     Respiratory: Negative.     Cardiovascular: Negative.    Gastrointestinal: Negative.    Genitourinary:  Positive for erectile dysfunction.   Musculoskeletal: Negative.    Skin: Negative.    Neurological: Negative.    Psychiatric/Behavioral: Negative.         Objective   Physical Exam  Constitutional:       Appearance: Normal appearance.   HENT:      Head: Normocephalic.   Cardiovascular:      Rate and Rhythm: Normal rate and regular rhythm.      Pulses: Normal pulses.      Heart sounds: Normal heart sounds.   Pulmonary:      Effort: Pulmonary effort is normal.      Breath sounds: Normal breath sounds.   Abdominal:       General: Bowel sounds are normal.   Musculoskeletal:         General: Normal range of motion.   Skin:     General: Skin is warm.   Neurological:      General: No focal deficit present.      Mental Status: He is alert and oriented to person, place, and time.   Psychiatric:         Mood and Affect: Mood normal.         Behavior: Behavior normal.         Procedures    Assessment & Plan   Diagnoses and all orders for this visit:    1. Tobacco abuse (Primary)    2. Anxiety    3. Lipid screening  -     Lipid Panel With LDL / HDL Ratio; Future    4. Family history of diabetes mellitus  -     Comprehensive Metabolic Panel; Future  -     Hemoglobin A1c; Future    5. Preventative health care  -     CBC & Differential; Future    6. Erectile dysfunction, unspecified erectile dysfunction type  -     Testosterone (Free & Total), LC / MS; Future    7. Gastroesophageal reflux disease, unspecified whether esophagitis present  -     pantoprazole (PROTONIX) 40 MG EC tablet; Take 1 tablet by mouth Daily.  Dispense: 90 tablet; Refill: 1           Current Outpatient Medications:     levocetirizine (XYZAL) 5 MG tablet, TAKE ONE TABLET BY MOUTH EVERY EVENING, Disp: 90 tablet, Rfl: 1    pantoprazole (PROTONIX) 40 MG EC tablet, Take 1 tablet by mouth Daily., Disp: 90 tablet, Rfl: 1    PARoxetine (PAXIL) 20 MG tablet, TAKE ONE TABLET BY MOUTH DAILY, Disp: 90 tablet, Rfl: 1           MARISOL Yates 4/18/2024 08:55 EDT  This note has been electronically signed

## 2024-04-18 NOTE — PATIENT INSTRUCTIONS
Fasting blood work  Cut back or stop fast foods  Diet and exercise  Stop vaping  Follow-up 6 months

## 2024-04-20 LAB
ALBUMIN SERPL-MCNC: 4.2 G/DL (ref 4.3–5.2)
ALBUMIN/GLOB SERPL: 2 {RATIO} (ref 1.2–2.2)
ALP SERPL-CCNC: 79 IU/L (ref 44–121)
ALT SERPL-CCNC: 16 IU/L (ref 0–44)
AST SERPL-CCNC: 28 IU/L (ref 0–40)
BASOPHILS # BLD AUTO: 0 X10E3/UL (ref 0–0.2)
BASOPHILS NFR BLD AUTO: 0 %
BILIRUB SERPL-MCNC: 0.4 MG/DL (ref 0–1.2)
BUN SERPL-MCNC: 12 MG/DL (ref 6–20)
BUN/CREAT SERPL: 11 (ref 9–20)
CALCIUM SERPL-MCNC: 9.3 MG/DL (ref 8.7–10.2)
CHLORIDE SERPL-SCNC: 105 MMOL/L (ref 96–106)
CHOLEST SERPL-MCNC: 177 MG/DL (ref 100–199)
CO2 SERPL-SCNC: 24 MMOL/L (ref 20–29)
CREAT SERPL-MCNC: 1.06 MG/DL (ref 0.76–1.27)
EGFRCR SERPLBLD CKD-EPI 2021: 97 ML/MIN/1.73
EOSINOPHIL # BLD AUTO: 0.1 X10E3/UL (ref 0–0.4)
EOSINOPHIL NFR BLD AUTO: 2 %
ERYTHROCYTE [DISTWIDTH] IN BLOOD BY AUTOMATED COUNT: 13.6 % (ref 11.6–15.4)
GLOBULIN SER CALC-MCNC: 2.1 G/DL (ref 1.5–4.5)
GLUCOSE SERPL-MCNC: 110 MG/DL (ref 70–99)
HBA1C MFR BLD: 5.8 % (ref 4.8–5.6)
HCT VFR BLD AUTO: 46.6 % (ref 37.5–51)
HDLC SERPL-MCNC: 35 MG/DL
HGB BLD-MCNC: 15.7 G/DL (ref 13–17.7)
IMM GRANULOCYTES # BLD AUTO: 0 X10E3/UL (ref 0–0.1)
IMM GRANULOCYTES NFR BLD AUTO: 0 %
LDLC SERPL CALC-MCNC: 103 MG/DL (ref 0–99)
LDLC/HDLC SERPL: 2.9 RATIO (ref 0–3.6)
LYMPHOCYTES # BLD AUTO: 1.7 X10E3/UL (ref 0.7–3.1)
LYMPHOCYTES NFR BLD AUTO: 32 %
MCH RBC QN AUTO: 30.6 PG (ref 26.6–33)
MCHC RBC AUTO-ENTMCNC: 33.7 G/DL (ref 31.5–35.7)
MCV RBC AUTO: 91 FL (ref 79–97)
MONOCYTES # BLD AUTO: 0.5 X10E3/UL (ref 0.1–0.9)
MONOCYTES NFR BLD AUTO: 9 %
NEUTROPHILS # BLD AUTO: 3 X10E3/UL (ref 1.4–7)
NEUTROPHILS NFR BLD AUTO: 57 %
PLATELET # BLD AUTO: 191 X10E3/UL (ref 150–450)
POTASSIUM SERPL-SCNC: 4.3 MMOL/L (ref 3.5–5.2)
PROT SERPL-MCNC: 6.3 G/DL (ref 6–8.5)
RBC # BLD AUTO: 5.13 X10E6/UL (ref 4.14–5.8)
SODIUM SERPL-SCNC: 142 MMOL/L (ref 134–144)
TRIGL SERPL-MCNC: 223 MG/DL (ref 0–149)
VLDLC SERPL CALC-MCNC: 39 MG/DL (ref 5–40)
WBC # BLD AUTO: 5.3 X10E3/UL (ref 3.4–10.8)

## 2024-04-24 ENCOUNTER — TELEPHONE (OUTPATIENT)
Dept: FAMILY MEDICINE CLINIC | Facility: CLINIC | Age: 29
End: 2024-04-24
Payer: MEDICAID

## 2024-04-24 NOTE — TELEPHONE ENCOUNTER
Caller: EMILY DELCID    Relationship: Emergency Contact    Best call back number: [phone]     What test was performed: LABS    When was the test performed: 4/19/24     Where was the test performed: [facility]

## 2024-04-25 LAB
TESTOST FREE SERPL-MCNC: 9.2 PG/ML (ref 9.3–26.5)
TESTOST SERPL-MCNC: 338.3 NG/DL (ref 264–916)

## 2024-07-15 DIAGNOSIS — F41.9 ANXIETY: ICD-10-CM

## 2024-07-15 RX ORDER — PAROXETINE HYDROCHLORIDE 20 MG/1
20 TABLET, FILM COATED ORAL DAILY
Qty: 90 TABLET | Refills: 1 | Status: SHIPPED | OUTPATIENT
Start: 2024-07-15

## 2024-08-20 ENCOUNTER — OFFICE VISIT (OUTPATIENT)
Dept: FAMILY MEDICINE CLINIC | Facility: CLINIC | Age: 29
End: 2024-08-20
Payer: MEDICAID

## 2024-08-20 VITALS
HEIGHT: 70 IN | SYSTOLIC BLOOD PRESSURE: 126 MMHG | OXYGEN SATURATION: 98 % | BODY MASS INDEX: 32.78 KG/M2 | TEMPERATURE: 97.3 F | DIASTOLIC BLOOD PRESSURE: 70 MMHG | HEART RATE: 71 BPM | WEIGHT: 229 LBS

## 2024-08-20 DIAGNOSIS — R73.03 PREDIABETES: ICD-10-CM

## 2024-08-20 DIAGNOSIS — N52.8 OTHER MALE ERECTILE DYSFUNCTION: ICD-10-CM

## 2024-08-20 DIAGNOSIS — Z00.00 PREVENTATIVE HEALTH CARE: Primary | ICD-10-CM

## 2024-08-20 DIAGNOSIS — K21.9 GASTROESOPHAGEAL REFLUX DISEASE WITHOUT ESOPHAGITIS: ICD-10-CM

## 2024-08-20 DIAGNOSIS — E66.09 CLASS 1 OBESITY DUE TO EXCESS CALORIES WITH SERIOUS COMORBIDITY AND BODY MASS INDEX (BMI) OF 32.0 TO 32.9 IN ADULT: ICD-10-CM

## 2024-08-20 DIAGNOSIS — J30.1 ALLERGIC RHINITIS DUE TO POLLEN, UNSPECIFIED SEASONALITY: ICD-10-CM

## 2024-08-20 DIAGNOSIS — K21.9 GASTROESOPHAGEAL REFLUX DISEASE, UNSPECIFIED WHETHER ESOPHAGITIS PRESENT: ICD-10-CM

## 2024-08-20 DIAGNOSIS — E78.2 MIXED HYPERLIPIDEMIA: ICD-10-CM

## 2024-08-20 PROBLEM — Z72.0 TOBACCO ABUSE: Status: RESOLVED | Noted: 2023-10-12 | Resolved: 2024-08-20

## 2024-08-20 RX ORDER — HYDROXYZINE 50 MG/1
50 TABLET, FILM COATED ORAL 3 TIMES DAILY PRN
Qty: 90 TABLET | Refills: 0 | Status: SHIPPED | OUTPATIENT
Start: 2024-08-20

## 2024-08-20 RX ORDER — PANTOPRAZOLE SODIUM 40 MG/1
40 TABLET, DELAYED RELEASE ORAL DAILY
Qty: 90 TABLET | Refills: 1 | Status: SHIPPED | OUTPATIENT
Start: 2024-08-20

## 2024-08-20 RX ORDER — LEVOCETIRIZINE DIHYDROCHLORIDE 5 MG/1
5 TABLET, FILM COATED ORAL EVERY EVENING
Qty: 90 TABLET | Refills: 1 | Status: SHIPPED | OUTPATIENT
Start: 2024-08-20

## 2024-08-20 NOTE — PROGRESS NOTES
"    Raj Cabrera is a 29 y.o. male.     History of Present Illness  29-year-old white male who vapes nicotine with history of GERD, anxiety and insomnia who comes in today for follow-up visit    Blood pressure 126/70 heart rate 70 he denies any chest pain, dyspnea, tachycardia or dizziness    On last visit we discussed patient stopping vaping and cutting back on fast food due to his low libido.  He has quit fast food she is also stopped vaping and he states that his libido has increased and is doing a lot better now.  Interestingly he has gained 7 pounds with a weight of 229 a BMI of 32.9.  More likely because he quit vaping and he is trying to food for comfort at least is not fast food.  He knows to try to watch his diet    His last blood sugar was 110 A1c of 5.8 we will be drawing some fasting labs to see if that is improved or worsened.  He has had 1 COVID-vaccine still need to schedule an eye exam          Fasting blood work  Diet and exercise  Schedule eye exam  Follow-up 6 months         The following portions of the patient's history were reviewed and updated as appropriate: allergies, current medications, past family history, past medical history, past social history, past surgical history, and problem list.    Vitals:    08/20/24 0840   BP: 126/70   BP Location: Right arm   Patient Position: Sitting   Cuff Size: Adult   Pulse: 71   Temp: 97.3 °F (36.3 °C)   TempSrc: Infrared   SpO2: 98%   Weight: 104 kg (229 lb)   Height: 177.8 cm (70\")     Body mass index is 32.86 kg/m².  BMI is >= 30 and <35. (Class 1 Obesity). The following options were offered after discussion;: exercise counseling/recommendations       Past Medical History:   Diagnosis Date    Allergic     Year round allergies    Anxiety     GERD (gastroesophageal reflux disease)     IBS (irritable bowel syndrome)     Stomach ulcer     Tobacco abuse 10/12/2023     Past Surgical History:   Procedure Laterality Date    ENDOSCOPY      SINUS SURGERY "       Family History   Problem Relation Age of Onset    Hypertension Mother     Cancer Mother         Bladder cancer (operable)    Heart disease Father      Immunization History   Administered Date(s) Administered    COVID-19 ([facility]) 05/08/2021    DTP 1994, 01/11/1996, 10/17/1996    DTaP 09/13/1999    Flu Vaccine Quad PF 6-35MO 01/07/2020    Hep B, Adolescent or Pediatric 1994, 1994, 01/11/1996    Hib (HbOC) 1994, 01/11/1996    Hib (PRP-T) 10/17/1996    MMR 01/11/1996, 09/13/1999    OPV 1994, 01/11/1996, 09/13/1999    Tdap 11/12/2017    Varicella 01/21/1998, 09/13/1999       Office Visit on 04/18/2024   Component Date Value Ref Range Status    Testosterone, Total 04/19/2024 338.3  264.0 - 916.0 ng/dL Final    Comment: This [facility] LC/MS-MS method is currently certified by the CDC  Hormone Standardization Program (HoSt). Adult male reference  interval is based on a population of healthy nonobese males  (BMI <30) between 19 and 39 years old. Gillian, et.al. JCEM  2017,102;6465-6515. PMID: [id number].      Testosterone, Free 04/19/2024 9.2 (L)  9.3 - 26.5 pg/mL Final    Total Cholesterol 04/19/2024 177  100 - 199 mg/dL Final    Triglycerides 04/19/2024 223 (H)  0 - 149 mg/dL Final    HDL Cholesterol 04/19/2024 35 (L)  >39 mg/dL Final    VLDL Cholesterol Kamron 04/19/2024 39  5 - 40 mg/dL Final    LDL Chol Calc (NIH) 04/19/2024 103 (H)  0 - 99 mg/dL Final    LDL/HDL RATIO 04/19/2024 2.9  0.0 - 3.6 ratio Final    Comment:                                     LDL/HDL Ratio                                              Men  Women                                1/2 Avg.Risk  1.0    1.5                                    Avg.Risk  3.6    3.2                                 2X Avg.Risk  6.2    5.0                                 3X Avg.Risk  8.0    6.1      Hemoglobin A1C 04/19/2024 5.8 (H)  4.8 - 5.6 % Final    Comment:          Prediabetes: 5.7 - 6.4           Diabetes: >6.4           Glycemic  control for adults with diabetes: <7.0      Glucose 04/19/2024 110 (H)  70 - 99 mg/dL Final    BUN 04/19/2024 12  6 - 20 mg/dL Final    Creatinine 04/19/2024 1.06  0.76 - 1.27 mg/dL Final    EGFR Result 04/19/2024 97  >59 mL/min/1.73 Final    BUN/Creatinine Ratio 04/19/2024 11  9 - 20 Final    Sodium 04/19/2024 142  134 - 144 mmol/L Final    Potassium 04/19/2024 4.3  3.5 - 5.2 mmol/L Final    Chloride 04/19/2024 105  96 - 106 mmol/L Final    Total CO2 04/19/2024 24  20 - 29 mmol/L Final    Calcium 04/19/2024 9.3  8.7 - 10.2 mg/dL Final    Total Protein 04/19/2024 6.3  6.0 - 8.5 g/dL Final    Albumin 04/19/2024 4.2 (L)  4.3 - 5.2 g/dL Final    Globulin 04/19/2024 2.1  1.5 - 4.5 g/dL Final    A/G Ratio 04/19/2024 2.0  1.2 - 2.2 Final    Total Bilirubin 04/19/2024 0.4  0.0 - 1.2 mg/dL Final    Alkaline Phosphatase 04/19/2024 79  44 - 121 IU/L Final    AST (SGOT) 04/19/2024 28  0 - 40 IU/L Final    ALT (SGPT) 04/19/2024 16  0 - 44 IU/L Final    WBC 04/19/2024 5.3  3.4 - 10.8 x10E3/uL Final    RBC 04/19/2024 5.13  4.14 - 5.80 x10E6/uL Final    Hemoglobin 04/19/2024 15.7  13.0 - 17.7 g/dL Final    Hematocrit 04/19/2024 46.6  37.5 - 51.0 % Final    MCV 04/19/2024 91  79 - 97 fL Final    MCH 04/19/2024 30.6  26.6 - 33.0 pg Final    MCHC 04/19/2024 33.7  31.5 - 35.7 g/dL Final    RDW 04/19/2024 13.6  11.6 - 15.4 % Final    Platelets 04/19/2024 191  150 - 450 x10E3/uL Final    Neutrophil Rel % 04/19/2024 57  Not Estab. % Final    Lymphocyte Rel % 04/19/2024 32  Not Estab. % Final    Monocyte Rel % 04/19/2024 9  Not Estab. % Final    Eosinophil Rel % 04/19/2024 2  Not Estab. % Final    Basophil Rel % 04/19/2024 0  Not Estab. % Final    Neutrophils Absolute 04/19/2024 3.0  1.4 - 7.0 x10E3/uL Final    Lymphocytes Absolute 04/19/2024 1.7  0.7 - 3.1 x10E3/uL Final    Monocytes Absolute 04/19/2024 0.5  0.1 - 0.9 x10E3/uL Final    Eosinophils Absolute 04/19/2024 0.1  0.0 - 0.4 x10E3/uL Final    Basophils Absolute 04/19/2024 0.0  0.0  - 0.2 x10E3/uL Final    Immature Granulocyte Rel % 04/19/2024 0  Not Estab. % Final    Immature Grans Absolute 04/19/2024 0.0  0.0 - 0.1 x10E3/uL Final         Review of Systems   Constitutional: Negative.    HENT: Negative.     Respiratory: Negative.     Cardiovascular: Negative.    Gastrointestinal: Negative.    Genitourinary: Negative.    Musculoskeletal: Negative.    Skin: Negative.    Neurological: Negative.    Psychiatric/Behavioral: Negative.         Objective   Physical Exam  Constitutional:       Appearance: Normal appearance.   HENT:      Head: Normocephalic.   Cardiovascular:      Rate and Rhythm: Normal rate and regular rhythm.      Pulses: Normal pulses.      Heart sounds: Normal heart sounds.   Pulmonary:      Effort: Pulmonary effort is normal.      Breath sounds: Normal breath sounds.   Abdominal:      General: Bowel sounds are normal.   Musculoskeletal:         General: Normal range of motion.   Skin:     General: Skin is warm.   Neurological:      General: No focal deficit present.      Mental Status: He is alert and oriented to person, place, and time.   Psychiatric:         Mood and Affect: Mood normal.         Behavior: Behavior normal.         Procedures    Assessment & Plan   Diagnoses and all orders for this visit:    1. Preventative health care (Primary)  -     TSH+Free T4  -     T3    2. Prediabetes  -     Comprehensive Metabolic Panel  -     Hemoglobin A1c    3. Mixed hyperlipidemia  -     Lipid Panel With LDL / HDL Ratio    4. Allergic rhinitis due to pollen, unspecified seasonality  -     levocetirizine (XYZAL) 5 MG tablet; Take 1 tablet by mouth Every Evening.  Dispense: 90 tablet; Refill: 1    5. Gastroesophageal reflux disease, unspecified whether esophagitis present  -     pantoprazole (PROTONIX) 40 MG EC tablet; Take 1 tablet by mouth Daily.  Dispense: 90 tablet; Refill: 1    6. Other male erectile dysfunction    7. Gastroesophageal reflux disease without esophagitis    8. Class 1  obesity due to excess calories with serious comorbidity and body mass index (BMI) of 32.0 to 32.9 in adult    Other orders  -     hydrOXYzine (ATARAX) 50 MG tablet; Take 1 tablet by mouth 3 (Three) Times a Day As Needed (insomnia).  Dispense: 90 tablet; Refill: 0           Current Outpatient Medications:     levocetirizine (XYZAL) 5 MG tablet, Take 1 tablet by mouth Every Evening., Disp: 90 tablet, Rfl: 1    pantoprazole (PROTONIX) 40 MG EC tablet, Take 1 tablet by mouth Daily., Disp: 90 tablet, Rfl: 1    PARoxetine (PAXIL) 20 MG tablet, TAKE 1 TABLET BY MOUTH DAILY, Disp: 90 tablet, Rfl: 1    hydrOXYzine (ATARAX) 50 MG tablet, Take 1 tablet by mouth 3 (Three) Times a Day As Needed (insomnia)., Disp: 90 tablet, Rfl: 0           Erin Harrison, APRN 8/20/2024 09:45 EDT  This note has been electronically signed

## 2024-08-21 LAB
ALBUMIN SERPL-MCNC: 4.2 G/DL (ref 4.3–5.2)
ALP SERPL-CCNC: 82 IU/L (ref 44–121)
ALT SERPL-CCNC: 65 IU/L (ref 0–44)
AST SERPL-CCNC: 30 IU/L (ref 0–40)
BILIRUB SERPL-MCNC: 0.4 MG/DL (ref 0–1.2)
BUN SERPL-MCNC: 15 MG/DL (ref 6–20)
BUN/CREAT SERPL: 14 (ref 9–20)
CALCIUM SERPL-MCNC: 9.1 MG/DL (ref 8.7–10.2)
CHLORIDE SERPL-SCNC: 103 MMOL/L (ref 96–106)
CHOLEST SERPL-MCNC: 205 MG/DL (ref 100–199)
CO2 SERPL-SCNC: 26 MMOL/L (ref 20–29)
CREAT SERPL-MCNC: 1.06 MG/DL (ref 0.76–1.27)
EGFRCR SERPLBLD CKD-EPI 2021: 97 ML/MIN/1.73
GLOBULIN SER CALC-MCNC: 2.3 G/DL (ref 1.5–4.5)
GLUCOSE SERPL-MCNC: 112 MG/DL (ref 70–99)
HBA1C MFR BLD: 6 % (ref 4.8–5.6)
HDLC SERPL-MCNC: 38 MG/DL
LDLC SERPL CALC-MCNC: 131 MG/DL (ref 0–99)
LDLC/HDLC SERPL: 3.4 RATIO (ref 0–3.6)
POTASSIUM SERPL-SCNC: 4.1 MMOL/L (ref 3.5–5.2)
PROT SERPL-MCNC: 6.5 G/DL (ref 6–8.5)
SODIUM SERPL-SCNC: 141 MMOL/L (ref 134–144)
T3 SERPL-MCNC: 119 NG/DL (ref 71–180)
T4 FREE SERPL-MCNC: 1.18 NG/DL (ref 0.82–1.77)
TRIGL SERPL-MCNC: 202 MG/DL (ref 0–149)
TSH SERPL DL<=0.005 MIU/L-ACNC: 4.4 UIU/ML (ref 0.45–4.5)
VLDLC SERPL CALC-MCNC: 36 MG/DL (ref 5–40)

## 2025-01-13 DIAGNOSIS — F41.9 ANXIETY: ICD-10-CM

## 2025-01-13 RX ORDER — PAROXETINE 20 MG/1
20 TABLET, FILM COATED ORAL DAILY
Qty: 90 TABLET | Refills: 1 | Status: SHIPPED | OUTPATIENT
Start: 2025-01-13

## 2025-02-20 ENCOUNTER — OFFICE VISIT (OUTPATIENT)
Dept: FAMILY MEDICINE CLINIC | Facility: CLINIC | Age: 30
End: 2025-02-20
Payer: MEDICAID

## 2025-02-20 VITALS
WEIGHT: 227.6 LBS | RESPIRATION RATE: 16 BRPM | DIASTOLIC BLOOD PRESSURE: 81 MMHG | SYSTOLIC BLOOD PRESSURE: 128 MMHG | OXYGEN SATURATION: 97 % | TEMPERATURE: 96.9 F | BODY MASS INDEX: 32.58 KG/M2 | HEART RATE: 83 BPM | HEIGHT: 70 IN

## 2025-02-20 DIAGNOSIS — Z72.0 TOBACCO ABUSE: ICD-10-CM

## 2025-02-20 DIAGNOSIS — Z00.00 PREVENTATIVE HEALTH CARE: ICD-10-CM

## 2025-02-20 DIAGNOSIS — F41.9 ANXIETY: ICD-10-CM

## 2025-02-20 DIAGNOSIS — E66.811 CLASS 1 OBESITY DUE TO EXCESS CALORIES WITH SERIOUS COMORBIDITY AND BODY MASS INDEX (BMI) OF 32.0 TO 32.9 IN ADULT: ICD-10-CM

## 2025-02-20 DIAGNOSIS — R73.03 PREDIABETES: ICD-10-CM

## 2025-02-20 DIAGNOSIS — E66.09 CLASS 1 OBESITY DUE TO EXCESS CALORIES WITH SERIOUS COMORBIDITY AND BODY MASS INDEX (BMI) OF 32.0 TO 32.9 IN ADULT: ICD-10-CM

## 2025-02-20 DIAGNOSIS — Z13.220 LIPID SCREENING: ICD-10-CM

## 2025-02-20 DIAGNOSIS — R53.83 OTHER FATIGUE: ICD-10-CM

## 2025-02-20 DIAGNOSIS — Z23 NEED FOR IMMUNIZATION AGAINST INFLUENZA: Primary | ICD-10-CM

## 2025-02-20 PROCEDURE — 99213 OFFICE O/P EST LOW 20 MIN: CPT | Performed by: NURSE PRACTITIONER

## 2025-02-20 PROCEDURE — T1015 CLINIC SERVICE: HCPCS | Performed by: NURSE PRACTITIONER

## 2025-02-20 RX ORDER — HYDROXYZINE HYDROCHLORIDE 50 MG/1
TABLET, FILM COATED ORAL
Qty: 90 TABLET | Refills: 0 | Status: SHIPPED | OUTPATIENT
Start: 2025-02-20

## 2025-02-20 RX ORDER — DIPHENHYDRAMINE HYDROCHLORIDE 25 MG/1
CAPSULE, LIQUID FILLED ORAL
Qty: 1 EACH | Refills: 0 | Status: SHIPPED | OUTPATIENT
Start: 2025-02-20

## 2025-02-20 RX ORDER — HYDROXYZINE HYDROCHLORIDE 50 MG/1
TABLET, FILM COATED ORAL
Qty: 90 TABLET | Refills: 0 | Status: CANCELLED | OUTPATIENT
Start: 2025-02-20

## 2025-02-20 RX ORDER — HYDROXYZINE HYDROCHLORIDE 50 MG/1
50 TABLET, FILM COATED ORAL 3 TIMES DAILY PRN
Qty: 90 TABLET | Refills: 0 | Status: SHIPPED | OUTPATIENT
Start: 2025-02-20 | End: 2025-02-20

## 2025-02-20 NOTE — TELEPHONE ENCOUNTER
[facility] called and needs a new script sent for Raj, on his HYDROXYZINE, it says 3 times a day for (insomnia), they said to take off the insomnia part and it would be good .

## 2025-02-20 NOTE — PROGRESS NOTES
"    Raj Cabrera is a 29 y.o. male.     History of Present Illness  29-year-old white male who vapes nicotine with history of GERD, anxiety and insomnia who comes in today for 6-month follow-up visit fasting blood work    Blood pressure 128/80 heart rate 82 he denies any chest pain, dizziness but does have some tachycardia once or twice a week that last for 30 seconds to 1 minute.  He is not sure if this is anxiety related or not.  He is not doing a lot of caffeine    He complains of having episodes where he starts to get weak when he starts doing any type of physical activity..  He states he becomes very hungry and goes in and starts binging food.  He is a prediabetic and I am going to order him a glucometer to see what his blood sugars are doing when these episodes happen since he is eating sweets as well.  He also has hypogonadism with low testosterone for his age which could also be contributing to his fatigue.  Will wait and see what blood sugars show.  His last blood sugar was 112 A1c 6.0 triglycerides 223    Patient has stopped drinking.  He still vaping.  Weight is 228 with a BMI of 32.7.  He has had 1 COVID vaccines still need to schedule an eye exam              Fasting blood work  Glucometer and supplies  Check blood sugar with episodes of weakness write down and call the office         The following portions of the patient's history were reviewed and updated as appropriate: allergies, current medications, past family history, past medical history, past social history, past surgical history, and problem list.    Vitals:    02/20/25 0830   BP: 128/81   BP Location: Left arm   Patient Position: Sitting   Cuff Size: Adult   Pulse: 83   Resp: 16   Temp: 96.9 °F (36.1 °C)   SpO2: 97%   Weight: 103 kg (227 lb 9.6 oz)   Height: 177.8 cm (70\")       Past Medical History:   Diagnosis Date    Allergic     Year round allergies    Anxiety     GERD (gastroesophageal reflux disease)     IBS (irritable bowel " syndrome)     Stomach ulcer     Tobacco abuse 10/12/2023     Past Surgical History:   Procedure Laterality Date    ENDOSCOPY      SINUS SURGERY       Family History   Problem Relation Age of Onset    Hypertension Mother     Cancer Mother         Bladder cancer (operable)    Heart disease Father      Immunization History   Administered Date(s) Administered    COVID-19 ([facility]) 05/08/2021    DTP 1994, 01/11/1996, 10/17/1996    DTaP 09/13/1999    Flu Vaccine Quad PF 6-35MO 01/07/2020    Hep B, Adolescent or Pediatric 1994, 1994, 01/11/1996    Hib (HbOC) 1994, 01/11/1996    Hib (PRP-T) 10/17/1996    MMR 01/11/1996, 09/13/1999    OPV 1994, 01/11/1996, 09/13/1999    Tdap 11/12/2017    Varicella 01/21/1998, 09/13/1999       Office Visit on 08/20/2024   Component Date Value Ref Range Status    Glucose 08/20/2024 112 (H)  70 - 99 mg/dL Final    BUN 08/20/2024 15  6 - 20 mg/dL Final    Creatinine 08/20/2024 1.06  0.76 - 1.27 mg/dL Final    EGFR Result 08/20/2024 97  >59 mL/min/1.73 Final    BUN/Creatinine Ratio 08/20/2024 14  9 - 20 Final    Sodium 08/20/2024 141  134 - 144 mmol/L Final    Potassium 08/20/2024 4.1  3.5 - 5.2 mmol/L Final    Chloride 08/20/2024 103  96 - 106 mmol/L Final    Total CO2 08/20/2024 26  20 - 29 mmol/L Final    Calcium 08/20/2024 9.1  8.7 - 10.2 mg/dL Final    Total Protein 08/20/2024 6.5  6.0 - 8.5 g/dL Final    Albumin 08/20/2024 4.2 (L)  4.3 - 5.2 g/dL Final    Globulin 08/20/2024 2.3  1.5 - 4.5 g/dL Final    Total Bilirubin 08/20/2024 0.4  0.0 - 1.2 mg/dL Final    Alkaline Phosphatase 08/20/2024 82  44 - 121 IU/L Final    AST (SGOT) 08/20/2024 30  0 - 40 IU/L Final    ALT (SGPT) 08/20/2024 65 (H)  0 - 44 IU/L Final    Hemoglobin A1C 08/20/2024 6.0 (H)  4.8 - 5.6 % Final    Comment:          Prediabetes: 5.7 - 6.4           Diabetes: >6.4           Glycemic control for adults with diabetes: <7.0      Total Cholesterol 08/20/2024 205 (H)  100 - 199 mg/dL Final     Triglycerides 08/20/2024 202 (H)  0 - 149 mg/dL Final    HDL Cholesterol 08/20/2024 38 (L)  >39 mg/dL Final    VLDL Cholesterol Kamron 08/20/2024 36  5 - 40 mg/dL Final    LDL Chol Calc (NIH) 08/20/2024 131 (H)  0 - 99 mg/dL Final    LDL/HDL RATIO 08/20/2024 3.4  0.0 - 3.6 ratio Final    Comment:                                     LDL/HDL Ratio                                              Men  Women                                1/2 Avg.Risk  1.0    1.5                                    Avg.Risk  3.6    3.2                                 2X Avg.Risk  6.2    5.0                                 3X Avg.Risk  8.0    6.1      TSH 08/20/2024 4.400  0.450 - 4.500 uIU/mL Final    Free T4 08/20/2024 1.18  0.82 - 1.77 ng/dL Final    T3, Total 08/20/2024 119  71 - 180 ng/dL Final         Review of Systems   Constitutional:  Positive for fatigue.   HENT: Negative.     Respiratory: Negative.     Cardiovascular: Negative.    Gastrointestinal: Negative.    Genitourinary: Negative.    Musculoskeletal: Negative.    Skin: Negative.    Neurological:  Positive for weakness.   Psychiatric/Behavioral: Negative.         Objective   Physical Exam  Constitutional:       Appearance: Normal appearance.   HENT:      Head: Normocephalic.   Cardiovascular:      Rate and Rhythm: Normal rate and regular rhythm.      Pulses: Normal pulses.      Heart sounds: Normal heart sounds.   Pulmonary:      Effort: Pulmonary effort is normal.      Breath sounds: Normal breath sounds.   Abdominal:      General: Bowel sounds are normal.   Musculoskeletal:         General: Normal range of motion.   Skin:     General: Skin is warm.   Neurological:      General: No focal deficit present.      Mental Status: He is alert and oriented to person, place, and time.   Psychiatric:         Mood and Affect: Mood normal.         Behavior: Behavior normal.         Procedures    Assessment & Plan   Diagnoses and all orders for this visit:    1. Need for immunization against  influenza (Primary)    2. Anxiety    3. Tobacco abuse    4. Class 1 obesity due to excess calories with serious comorbidity and body mass index (BMI) of 32.0 to 32.9 in adult    5. Preventative health care  -     CBC & Differential    6. Prediabetes  -     Comprehensive Metabolic Panel  -     Hemoglobin A1c    7. Lipid screening  -     Lipid Panel With LDL / HDL Ratio    8. Other fatigue  -     TSH+Free T4  -     Testosterone (Free & Total), LC / MS  -     T3    Other orders  -     hydrOXYzine (ATARAX) 50 MG tablet; Take 1 tablet by mouth 3 (Three) Times a Day As Needed (insomnia).  Dispense: 90 tablet; Refill: 0  -     Blood Glucose Monitoring Suppl (Blood Glucose Monitor System) w/Device kit; Check blood sugars 2x day  Indications: relion meter and supplies   R73.03  Dispense: 1 each; Refill: 0           Current Outpatient Medications:     hydrOXYzine (ATARAX) 50 MG tablet, Take 1 tablet by mouth 3 (Three) Times a Day As Needed (insomnia)., Disp: 90 tablet, Rfl: 0    Blood Glucose Monitoring Suppl (Blood Glucose Monitor System) w/Device kit, Check blood sugars 2x day  Indications: relion meter and supplies   R73.03, Disp: 1 each, Rfl: 0    levocetirizine (XYZAL) 5 MG tablet, Take 1 tablet by mouth Every Evening., Disp: 90 tablet, Rfl: 1    pantoprazole (PROTONIX) 40 MG EC tablet, Take 1 tablet by mouth Daily., Disp: 90 tablet, Rfl: 1    PARoxetine (PAXIL) 20 MG tablet, TAKE 1 TABLET BY MOUTH DAILY, Disp: 90 tablet, Rfl: 1           MARISOL Yates 2/20/2025 09:47 EST  This note has been electronically signed

## 2025-02-21 LAB
ALBUMIN SERPL-MCNC: 4.5 G/DL (ref 4.3–5.2)
ALP SERPL-CCNC: 77 IU/L (ref 44–121)
ALT SERPL-CCNC: 27 IU/L (ref 0–44)
AST SERPL-CCNC: 15 IU/L (ref 0–40)
BASOPHILS # BLD AUTO: 0 X10E3/UL (ref 0–0.2)
BASOPHILS NFR BLD AUTO: 0 %
BILIRUB SERPL-MCNC: 0.3 MG/DL (ref 0–1.2)
BUN SERPL-MCNC: 14 MG/DL (ref 6–20)
BUN/CREAT SERPL: 13 (ref 9–20)
CALCIUM SERPL-MCNC: 9.4 MG/DL (ref 8.7–10.2)
CHLORIDE SERPL-SCNC: 103 MMOL/L (ref 96–106)
CHOLEST SERPL-MCNC: 214 MG/DL (ref 100–199)
CO2 SERPL-SCNC: 23 MMOL/L (ref 20–29)
CREAT SERPL-MCNC: 1.08 MG/DL (ref 0.76–1.27)
EGFRCR SERPLBLD CKD-EPI 2021: 95 ML/MIN/1.73
EOSINOPHIL # BLD AUTO: 0.1 X10E3/UL (ref 0–0.4)
EOSINOPHIL NFR BLD AUTO: 2 %
ERYTHROCYTE [DISTWIDTH] IN BLOOD BY AUTOMATED COUNT: 12.7 % (ref 11.6–15.4)
GLOBULIN SER CALC-MCNC: 2.4 G/DL (ref 1.5–4.5)
GLUCOSE SERPL-MCNC: 128 MG/DL (ref 70–99)
HBA1C MFR BLD: 6.1 % (ref 4.8–5.6)
HCT VFR BLD AUTO: 47.6 % (ref 37.5–51)
HDLC SERPL-MCNC: 36 MG/DL
HGB BLD-MCNC: 16.5 G/DL (ref 13–17.7)
IMM GRANULOCYTES # BLD AUTO: 0 X10E3/UL (ref 0–0.1)
IMM GRANULOCYTES NFR BLD AUTO: 0 %
LDLC SERPL CALC-MCNC: 148 MG/DL (ref 0–99)
LDLC/HDLC SERPL: 4.1 RATIO (ref 0–3.6)
LYMPHOCYTES # BLD AUTO: 1.9 X10E3/UL (ref 0.7–3.1)
LYMPHOCYTES NFR BLD AUTO: 30 %
MCH RBC QN AUTO: 30.8 PG (ref 26.6–33)
MCHC RBC AUTO-ENTMCNC: 34.7 G/DL (ref 31.5–35.7)
MCV RBC AUTO: 89 FL (ref 79–97)
MONOCYTES # BLD AUTO: 0.6 X10E3/UL (ref 0.1–0.9)
MONOCYTES NFR BLD AUTO: 9 %
NEUTROPHILS # BLD AUTO: 3.8 X10E3/UL (ref 1.4–7)
NEUTROPHILS NFR BLD AUTO: 59 %
PLATELET # BLD AUTO: 225 X10E3/UL (ref 150–450)
POTASSIUM SERPL-SCNC: 4.2 MMOL/L (ref 3.5–5.2)
PROT SERPL-MCNC: 6.9 G/DL (ref 6–8.5)
RBC # BLD AUTO: 5.35 X10E6/UL (ref 4.14–5.8)
SODIUM SERPL-SCNC: 141 MMOL/L (ref 134–144)
T3 SERPL-MCNC: 126 NG/DL (ref 71–180)
T4 FREE SERPL-MCNC: 1.22 NG/DL (ref 0.82–1.77)
TRIGL SERPL-MCNC: 166 MG/DL (ref 0–149)
TSH SERPL DL<=0.005 MIU/L-ACNC: 5.18 UIU/ML (ref 0.45–4.5)
VLDLC SERPL CALC-MCNC: 30 MG/DL (ref 5–40)
WBC # BLD AUTO: 6.4 X10E3/UL (ref 3.4–10.8)

## 2025-02-24 RX ORDER — ATORVASTATIN CALCIUM 10 MG/1
10 TABLET, FILM COATED ORAL DAILY
Qty: 90 TABLET | Refills: 0 | Status: SHIPPED | OUTPATIENT
Start: 2025-02-24

## 2025-02-27 LAB
TESTOST FREE SERPL-MCNC: 11.2 PG/ML (ref 8.7–25.1)
TESTOST SERPL-MCNC: 338.3 NG/DL (ref 264–916)

## 2025-03-03 DIAGNOSIS — K21.9 GASTROESOPHAGEAL REFLUX DISEASE, UNSPECIFIED WHETHER ESOPHAGITIS PRESENT: ICD-10-CM

## 2025-03-03 RX ORDER — PANTOPRAZOLE SODIUM 40 MG/1
40 TABLET, DELAYED RELEASE ORAL DAILY
Qty: 90 TABLET | Refills: 1 | Status: SHIPPED | OUTPATIENT
Start: 2025-03-03

## 2025-03-26 ENCOUNTER — OFFICE VISIT (OUTPATIENT)
Dept: FAMILY MEDICINE CLINIC | Facility: CLINIC | Age: 30
End: 2025-03-26
Payer: MEDICAID

## 2025-03-26 VITALS
WEIGHT: 225.4 LBS | HEIGHT: 70 IN | HEART RATE: 67 BPM | DIASTOLIC BLOOD PRESSURE: 65 MMHG | RESPIRATION RATE: 16 BRPM | OXYGEN SATURATION: 97 % | TEMPERATURE: 97.1 F | BODY MASS INDEX: 32.27 KG/M2 | SYSTOLIC BLOOD PRESSURE: 102 MMHG

## 2025-03-26 DIAGNOSIS — E66.811 CLASS 1 OBESITY DUE TO EXCESS CALORIES WITH SERIOUS COMORBIDITY AND BODY MASS INDEX (BMI) OF 32.0 TO 32.9 IN ADULT: Primary | ICD-10-CM

## 2025-03-26 DIAGNOSIS — E66.09 CLASS 1 OBESITY DUE TO EXCESS CALORIES WITH SERIOUS COMORBIDITY AND BODY MASS INDEX (BMI) OF 32.0 TO 32.9 IN ADULT: Primary | ICD-10-CM

## 2025-03-26 DIAGNOSIS — R14.2 BURPING: ICD-10-CM

## 2025-03-26 DIAGNOSIS — R10.84 GENERALIZED ABDOMINAL PAIN: ICD-10-CM

## 2025-03-26 DIAGNOSIS — R14.0 ABDOMINAL BLOATING: ICD-10-CM

## 2025-03-26 PROCEDURE — 99213 OFFICE O/P EST LOW 20 MIN: CPT | Performed by: NURSE PRACTITIONER

## 2025-03-26 PROCEDURE — T1015 CLINIC SERVICE: HCPCS | Performed by: NURSE PRACTITIONER

## 2025-03-26 RX ORDER — GINSENG 100 MG
1 CAPSULE ORAL DAILY
Qty: 30 EACH | Refills: 6 | Status: SHIPPED | OUTPATIENT
Start: 2025-03-26

## 2025-03-26 RX ORDER — ONDANSETRON 4 MG/1
4 TABLET, FILM COATED ORAL EVERY 8 HOURS PRN
Qty: 30 TABLET | Refills: 2 | Status: SHIPPED | OUTPATIENT
Start: 2025-03-26

## 2025-03-26 NOTE — PATIENT INSTRUCTIONS
Stop Lipitor  Acidophilus 2 capsules daily for 1 week then 1 capsule daily  Gas-X 2 tablets 3 times a day for 1 week then as needed  Zofran 4 milligrams 3 times a day as needed for nausea  Call next week with status

## 2025-03-26 NOTE — PROGRESS NOTES
"    Raj Cabrera is a 29 y.o. male.     History of Present Illness  29-year-old white female who vapes nicotine with history of GERD, anxiety and insomnia who comes in today for an acute visit    Blood pressure 102/64 heart rate 66 he denies any chest pain, dyspnea, tachycardia or dizziness    Patient states she has had 1 to 2-week history of abdominal bloating some nausea and vomiting fatigue and finger tingling.  We did put him on a statin on his last visit which I am suspecting is probably causing the problems.  Will going to stop the statin I am placing him on Gas-X acidophilus and Zofran for nausea please go to let me know next week if symptoms have improved.  Patient's bowels are moving normally he normally goes 3-4 times a day and they are well-formed    Weight is 225 a BMI of 32.3.  He has had 1 COVID vaccines still need to schedule an eye exam              Stop Lipitor  Acidophilus 2 capsules daily for 1 week then 1 capsule daily  Gas-X 2 tablets 3 times a day for 1 week then as needed  Zofran 4 milligrams 3 times a day as needed for nausea  Call next week with status  Nausea  Symptoms include abdominal pain, fatigue and nausea.         The following portions of the patient's history were reviewed and updated as appropriate: allergies, current medications, past family history, past medical history, past social history, past surgical history, and problem list.    Vitals:    03/26/25 0945   BP: 102/65   BP Location: Right arm   Patient Position: Sitting   Cuff Size: Large Adult   Pulse: 67   Resp: 16   Temp: 97.1 °F (36.2 °C)   SpO2: 97%   Weight: 102 kg (225 lb 6.4 oz)   Height: 177.8 cm (70\")       Past Medical History:   Diagnosis Date    Allergic     Year round allergies    Anxiety     GERD (gastroesophageal reflux disease)     IBS (irritable bowel syndrome)     Stomach ulcer     Tobacco abuse 10/12/2023     Past Surgical History:   Procedure Laterality Date    ENDOSCOPY      SINUS SURGERY   "     Family History   Problem Relation Age of Onset    Hypertension Mother     Cancer Mother         Bladder cancer (operable)    Heart disease Father      Immunization History   Administered Date(s) Administered    COVID-19 (MICHAEL) 05/08/2021    DTP 1994, 01/11/1996, 10/17/1996    DTaP 09/13/1999    Flu Vaccine Quad PF 6-35MO 01/07/2020    Hep B, Adolescent or Pediatric 1994, 1994, 01/11/1996    Hib (HbOC) 1994, 01/11/1996    Hib (PRP-T) 10/17/1996    MMR 01/11/1996, 09/13/1999    OPV 1994, 01/11/1996, 09/13/1999    Tdap 11/12/2017    Varicella 01/21/1998, 09/13/1999       Office Visit on 02/20/2025   Component Date Value Ref Range Status    WBC 02/20/2025 6.4  3.4 - 10.8 x10E3/uL Final    RBC 02/20/2025 5.35  4.14 - 5.80 x10E6/uL Final    Hemoglobin 02/20/2025 16.5  13.0 - 17.7 g/dL Final    Hematocrit 02/20/2025 47.6  37.5 - 51.0 % Final    MCV 02/20/2025 89  79 - 97 fL Final    MCH 02/20/2025 30.8  26.6 - 33.0 pg Final    MCHC 02/20/2025 34.7  31.5 - 35.7 g/dL Final    RDW 02/20/2025 12.7  11.6 - 15.4 % Final    Platelets 02/20/2025 225  150 - 450 x10E3/uL Final    Neutrophil Rel % 02/20/2025 59  Not Estab. % Final    Lymphocyte Rel % 02/20/2025 30  Not Estab. % Final    Monocyte Rel % 02/20/2025 9  Not Estab. % Final    Eosinophil Rel % 02/20/2025 2  Not Estab. % Final    Basophil Rel % 02/20/2025 0  Not Estab. % Final    Neutrophils Absolute 02/20/2025 3.8  1.4 - 7.0 x10E3/uL Final    Lymphocytes Absolute 02/20/2025 1.9  0.7 - 3.1 x10E3/uL Final    Monocytes Absolute 02/20/2025 0.6  0.1 - 0.9 x10E3/uL Final    Eosinophils Absolute 02/20/2025 0.1  0.0 - 0.4 x10E3/uL Final    Basophils Absolute 02/20/2025 0.0  0.0 - 0.2 x10E3/uL Final    Immature Granulocyte Rel % 02/20/2025 0  Not Estab. % Final    Immature Grans Absolute 02/20/2025 0.0  0.0 - 0.1 x10E3/uL Final    Glucose 02/20/2025 128 (H)  70 - 99 mg/dL Final    BUN 02/20/2025 14  6 - 20 mg/dL Final    Creatinine 02/20/2025  1.08  0.76 - 1.27 mg/dL Final    EGFR Result 02/20/2025 95  >59 mL/min/1.73 Final    BUN/Creatinine Ratio 02/20/2025 13  9 - 20 Final    Sodium 02/20/2025 141  134 - 144 mmol/L Final    Potassium 02/20/2025 4.2  3.5 - 5.2 mmol/L Final    Chloride 02/20/2025 103  96 - 106 mmol/L Final    Total CO2 02/20/2025 23  20 - 29 mmol/L Final    Calcium 02/20/2025 9.4  8.7 - 10.2 mg/dL Final    Total Protein 02/20/2025 6.9  6.0 - 8.5 g/dL Final    Albumin 02/20/2025 4.5  4.3 - 5.2 g/dL Final    Globulin 02/20/2025 2.4  1.5 - 4.5 g/dL Final    Total Bilirubin 02/20/2025 0.3  0.0 - 1.2 mg/dL Final    Alkaline Phosphatase 02/20/2025 77  44 - 121 IU/L Final    AST (SGOT) 02/20/2025 15  0 - 40 IU/L Final    ALT (SGPT) 02/20/2025 27  0 - 44 IU/L Final    Hemoglobin A1C 02/20/2025 6.1 (H)  4.8 - 5.6 % Final    Comment:          Prediabetes: 5.7 - 6.4           Diabetes: >6.4           Glycemic control for adults with diabetes: <7.0      Total Cholesterol 02/20/2025 214 (H)  100 - 199 mg/dL Final    Triglycerides 02/20/2025 166 (H)  0 - 149 mg/dL Final    HDL Cholesterol 02/20/2025 36 (L)  >39 mg/dL Final    VLDL Cholesterol Kamron 02/20/2025 30  5 - 40 mg/dL Final    LDL Chol Calc (NIH) 02/20/2025 148 (H)  0 - 99 mg/dL Final    LDL/HDL RATIO 02/20/2025 4.1 (H)  0.0 - 3.6 ratio Final    Comment:                                     LDL/HDL Ratio                                              Men  Women                                1/2 Avg.Risk  1.0    1.5                                    Avg.Risk  3.6    3.2                                 2X Avg.Risk  6.2    5.0                                 3X Avg.Risk  8.0    6.1      TSH 02/20/2025 5.180 (H)  0.450 - 4.500 uIU/mL Final    Free T4 02/20/2025 1.22  0.82 - 1.77 ng/dL Final    Testosterone, Total 02/20/2025 338.3  264.0 - 916.0 ng/dL Final    Comment: This [facility] LC/MS-MS method is currently certified by the CDC  Hormone Standardization Program (HoSt). Adult male reference  interval  is based on a population of healthy nonobese males  (BMI <30) between 19 and 39 years old. samanta French.al. JCEM  2017,102;2738-0857. PMID: 35695842.      Testosterone, Free 02/20/2025 11.2  8.7 - 25.1 pg/mL Final    T3, Total 02/20/2025 126  71 - 180 ng/dL Final         Review of Systems   Constitutional:  Positive for fatigue.   HENT: Negative.     Respiratory: Negative.     Cardiovascular: Negative.    Gastrointestinal:  Positive for abdominal distention, abdominal pain and nausea.   Genitourinary: Negative.    Musculoskeletal: Negative.    Skin: Negative.    Neurological: Negative.    Psychiatric/Behavioral: Negative.         Objective   Physical Exam  Constitutional:       Appearance: Normal appearance.   HENT:      Head: Normocephalic.   Cardiovascular:      Rate and Rhythm: Normal rate and regular rhythm.      Pulses: Normal pulses.      Heart sounds: Normal heart sounds.   Pulmonary:      Effort: Pulmonary effort is normal.      Breath sounds: Normal breath sounds.   Abdominal:      General: Bowel sounds are normal.   Musculoskeletal:         General: Normal range of motion.   Skin:     General: Skin is warm.   Neurological:      General: No focal deficit present.      Mental Status: He is alert and oriented to person, place, and time.   Psychiatric:         Mood and Affect: Mood normal.         Behavior: Behavior normal.         Procedures    Assessment & Plan   There are no diagnoses linked to this encounter.       Current Outpatient Medications:     Blood Glucose Monitoring Suppl (Blood Glucose Monitor System) w/Device kit, Check blood sugars 2x day  Indications: relion meter and supplies   R73.03, Disp: 1 each, Rfl: 0    hydrOXYzine (ATARAX) 50 MG tablet, 1 tablet 1-3 times a day as needed for anxiety or insomnia, Disp: 90 tablet, Rfl: 0    levocetirizine (XYZAL) 5 MG tablet, Take 1 tablet by mouth Every Evening., Disp: 90 tablet, Rfl: 1    ondansetron (Zofran) 4 MG tablet, Take 1 tablet by mouth Every 8  (Eight) Hours As Needed for Nausea., Disp: 30 tablet, Rfl: 2    pantoprazole (PROTONIX) 40 MG EC tablet, TAKE 1 TABLET BY MOUTH DAILY, Disp: 90 tablet, Rfl: 1    PARoxetine (PAXIL) 20 MG tablet, TAKE 1 TABLET BY MOUTH DAILY, Disp: 90 tablet, Rfl: 1    Probiotic Product (Acidophilus High-Potency) capsule, Take 1 tablet by mouth Daily., Disp: 30 each, Rfl: 6           Erin Harrison, APRN 3/26/2025 10:44 EDT  This note has been electronically signed

## 2025-04-07 ENCOUNTER — OFFICE VISIT (OUTPATIENT)
Dept: FAMILY MEDICINE CLINIC | Facility: CLINIC | Age: 30
End: 2025-04-07
Payer: MEDICAID

## 2025-04-07 ENCOUNTER — TELEPHONE (OUTPATIENT)
Dept: FAMILY MEDICINE CLINIC | Facility: CLINIC | Age: 30
End: 2025-04-07

## 2025-04-07 VITALS
HEIGHT: 70 IN | WEIGHT: 223.4 LBS | TEMPERATURE: 98.3 F | BODY MASS INDEX: 31.98 KG/M2 | HEART RATE: 78 BPM | SYSTOLIC BLOOD PRESSURE: 121 MMHG | DIASTOLIC BLOOD PRESSURE: 83 MMHG | OXYGEN SATURATION: 96 %

## 2025-04-07 DIAGNOSIS — R11.0 NAUSEA IN ADULT: ICD-10-CM

## 2025-04-07 DIAGNOSIS — Z72.0 NICOTINE ABUSE: ICD-10-CM

## 2025-04-07 DIAGNOSIS — E66.09 CLASS 1 OBESITY DUE TO EXCESS CALORIES WITH SERIOUS COMORBIDITY AND BODY MASS INDEX (BMI) OF 32.0 TO 32.9 IN ADULT: ICD-10-CM

## 2025-04-07 DIAGNOSIS — Z87.11 HISTORY OF GASTRIC ULCER: ICD-10-CM

## 2025-04-07 DIAGNOSIS — K21.9 GASTROESOPHAGEAL REFLUX DISEASE, UNSPECIFIED WHETHER ESOPHAGITIS PRESENT: ICD-10-CM

## 2025-04-07 DIAGNOSIS — E66.811 CLASS 1 OBESITY DUE TO EXCESS CALORIES WITH SERIOUS COMORBIDITY AND BODY MASS INDEX (BMI) OF 32.0 TO 32.9 IN ADULT: ICD-10-CM

## 2025-04-07 DIAGNOSIS — R14.0 POSTPRANDIAL ABDOMINAL BLOATING: ICD-10-CM

## 2025-04-07 DIAGNOSIS — R10.11 RUQ PAIN: ICD-10-CM

## 2025-04-07 DIAGNOSIS — Z87.11 HISTORY OF STOMACH ULCERS: Primary | ICD-10-CM

## 2025-04-07 PROBLEM — K25.9 MULTIPLE GASTRIC ULCERS: Status: RESOLVED | Noted: 2023-01-19 | Resolved: 2025-04-07

## 2025-04-07 RX ORDER — SUCRALFATE 1 G/1
1 TABLET ORAL 4 TIMES DAILY
Qty: 120 TABLET | Refills: 1 | Status: SHIPPED | OUTPATIENT
Start: 2025-04-07

## 2025-04-07 RX ORDER — PANTOPRAZOLE SODIUM 40 MG/1
40 TABLET, DELAYED RELEASE ORAL 2 TIMES DAILY
Qty: 180 TABLET | Refills: 1 | Status: SHIPPED | OUTPATIENT
Start: 2025-04-07

## 2025-04-07 NOTE — PATIENT INSTRUCTIONS
Lipase  KUB/right upper quadrant ultrasound at [facility] referral for EGD  Carafate 1 g 4 times daily  Increase Protonix 40 mg to twice a day  Take Zofran 3 times a day routinely till EGD for nausea  Stop drinking coffee tea and caffeinated products  Stop vaping

## 2025-04-07 NOTE — PROGRESS NOTES
"    Raj Cabrera is a 29 y.o. male.     History of Present Illness  29-year-old white male who vapes nicotine with history of GERD, gastric ulcers, anxiety and insomnia who comes in today for follow-up visit    Blood pressure 120/82 heart rate 78 he denies any chest pain, dyspnea tachycardia or dizziness    I saw patient on March 26 for nausea and abdominal bloating.  I placed him on Gas-X Zofran and acidophilus.  He states that the gas and burping are better however the nausea has worsened and now his bowels are loose.  He states his bowels are yellow in color.  I am going to check patient's lipase today order right upper quadrant ultrasound, KUB and I am referring him to general surgery for an EGD to see if gastric ulcers have recurred.  I am going to have him take Protonix twice a day and add Carafate 4 times daily till he sees general surgery for EGD.  Patient has been under a lot of stress lately and continues to vape.  I did review foods and drinks to avoid with ulcers.  He drinks a lot of coffee.    Weight is 223 with a BMI of 32.1.  He has had 1 COVID vaccines still need to schedule an eye exam          Lipase  KUB/right upper quadrant ultrasound at [facility] referral for EGD  Carafate 1 g 4 times daily  Increase Protonix 40 mg to twice a day  Take Zofran 3 times a day routinely till EGD for nausea  Stop drinking coffee tea and caffeinated products  Stop vaping       The following portions of the patient's history were reviewed and updated as appropriate: allergies, current medications, past family history, past medical history, past social history, past surgical history, and problem list.    Vitals:    04/07/25 1354   BP: 121/83   BP Location: Right arm   Patient Position: Sitting   Cuff Size: Large Adult   Pulse: 78   Temp: 98.3 °F (36.8 °C)   TempSrc: Infrared   SpO2: 96%   Weight: 101 kg (223 lb 6.4 oz)   Height: 177.8 cm (70\")       Past Medical History:   Diagnosis Date    Allergic  "    Year round allergies    Anxiety     GERD (gastroesophageal reflux disease)     Hyperlipidemia     IBS (irritable bowel syndrome)     Stomach ulcer     Tobacco abuse 10/12/2023     Past Surgical History:   Procedure Laterality Date    ENDOSCOPY      SINUS SURGERY       Family History   Problem Relation Age of Onset    Hypertension Mother     Cancer Mother         Bladder cancer (operable)    Heart disease Father      Immunization History   Administered Date(s) Administered    COVID-19 (MICHAEL) 05/08/2021    DTP 1994, 01/11/1996, 10/17/1996    DTaP 09/13/1999    Flu Vaccine Quad PF 6-35MO 01/07/2020    Hep B, Adolescent or Pediatric 1994, 1994, 01/11/1996    Hib (HbOC) 1994, 01/11/1996    Hib (PRP-T) 10/17/1996    MMR 01/11/1996, 09/13/1999    OPV 1994, 01/11/1996, 09/13/1999    Tdap 11/12/2017    Varicella 01/21/1998, 09/13/1999       Office Visit on 02/20/2025   Component Date Value Ref Range Status    WBC 02/20/2025 6.4  3.4 - 10.8 x10E3/uL Final    RBC 02/20/2025 5.35  4.14 - 5.80 x10E6/uL Final    Hemoglobin 02/20/2025 16.5  13.0 - 17.7 g/dL Final    Hematocrit 02/20/2025 47.6  37.5 - 51.0 % Final    MCV 02/20/2025 89  79 - 97 fL Final    MCH 02/20/2025 30.8  26.6 - 33.0 pg Final    MCHC 02/20/2025 34.7  31.5 - 35.7 g/dL Final    RDW 02/20/2025 12.7  11.6 - 15.4 % Final    Platelets 02/20/2025 225  150 - 450 x10E3/uL Final    Neutrophil Rel % 02/20/2025 59  Not Estab. % Final    Lymphocyte Rel % 02/20/2025 30  Not Estab. % Final    Monocyte Rel % 02/20/2025 9  Not Estab. % Final    Eosinophil Rel % 02/20/2025 2  Not Estab. % Final    Basophil Rel % 02/20/2025 0  Not Estab. % Final    Neutrophils Absolute 02/20/2025 3.8  1.4 - 7.0 x10E3/uL Final    Lymphocytes Absolute 02/20/2025 1.9  0.7 - 3.1 x10E3/uL Final    Monocytes Absolute 02/20/2025 0.6  0.1 - 0.9 x10E3/uL Final    Eosinophils Absolute 02/20/2025 0.1  0.0 - 0.4 x10E3/uL Final    Basophils Absolute 02/20/2025 0.0  0.0 -  0.2 x10E3/uL Final    Immature Granulocyte Rel % 02/20/2025 0  Not Estab. % Final    Immature Grans Absolute 02/20/2025 0.0  0.0 - 0.1 x10E3/uL Final    Glucose 02/20/2025 128 (H)  70 - 99 mg/dL Final    BUN 02/20/2025 14  6 - 20 mg/dL Final    Creatinine 02/20/2025 1.08  0.76 - 1.27 mg/dL Final    EGFR Result 02/20/2025 95  >59 mL/min/1.73 Final    BUN/Creatinine Ratio 02/20/2025 13  9 - 20 Final    Sodium 02/20/2025 141  134 - 144 mmol/L Final    Potassium 02/20/2025 4.2  3.5 - 5.2 mmol/L Final    Chloride 02/20/2025 103  96 - 106 mmol/L Final    Total CO2 02/20/2025 23  20 - 29 mmol/L Final    Calcium 02/20/2025 9.4  8.7 - 10.2 mg/dL Final    Total Protein 02/20/2025 6.9  6.0 - 8.5 g/dL Final    Albumin 02/20/2025 4.5  4.3 - 5.2 g/dL Final    Globulin 02/20/2025 2.4  1.5 - 4.5 g/dL Final    Total Bilirubin 02/20/2025 0.3  0.0 - 1.2 mg/dL Final    Alkaline Phosphatase 02/20/2025 77  44 - 121 IU/L Final    AST (SGOT) 02/20/2025 15  0 - 40 IU/L Final    ALT (SGPT) 02/20/2025 27  0 - 44 IU/L Final    Hemoglobin A1C 02/20/2025 6.1 (H)  4.8 - 5.6 % Final    Comment:          Prediabetes: 5.7 - 6.4           Diabetes: >6.4           Glycemic control for adults with diabetes: <7.0      Total Cholesterol 02/20/2025 214 (H)  100 - 199 mg/dL Final    Triglycerides 02/20/2025 166 (H)  0 - 149 mg/dL Final    HDL Cholesterol 02/20/2025 36 (L)  >39 mg/dL Final    VLDL Cholesterol Kamron 02/20/2025 30  5 - 40 mg/dL Final    LDL Chol Calc (NIH) 02/20/2025 148 (H)  0 - 99 mg/dL Final    LDL/HDL RATIO 02/20/2025 4.1 (H)  0.0 - 3.6 ratio Final    Comment:                                     LDL/HDL Ratio                                              Men  Women                                1/2 Avg.Risk  1.0    1.5                                    Avg.Risk  3.6    3.2                                 2X Avg.Risk  6.2    5.0                                 3X Avg.Risk  8.0    6.1      TSH 02/20/2025 5.180 (H)  0.450 - 4.500 uIU/mL Final     Free T4 02/20/2025 1.22  0.82 - 1.77 ng/dL Final    Testosterone, Total 02/20/2025 338.3  264.0 - 916.0 ng/dL Final    Comment: This [facility] LC/MS-MS method is currently certified by the CDC  Hormone Standardization Program (HoSt). Adult male reference  interval is based on a population of healthy nonobese males  (BMI <30) between 19 and 39 years old. Gillian, et.al. JCEM  2017,102;7348-0278. PMID: 96034715.      Testosterone, Free 02/20/2025 11.2  8.7 - 25.1 pg/mL Final    T3, Total 02/20/2025 126  71 - 180 ng/dL Final         Review of Systems   Constitutional: Negative.    HENT: Negative.     Respiratory: Negative.     Cardiovascular: Negative.    Gastrointestinal: Negative.  Positive for abdominal pain, nausea and GERD.        Loose yellow stools   Genitourinary: Negative.    Musculoskeletal: Negative.    Skin: Negative.    Neurological: Negative.    Psychiatric/Behavioral: Negative.         Objective   Physical Exam  Constitutional:       Appearance: Normal appearance.   HENT:      Head: Normocephalic.   Cardiovascular:      Rate and Rhythm: Normal rate and regular rhythm.      Pulses: Normal pulses.      Heart sounds: Normal heart sounds.   Pulmonary:      Effort: Pulmonary effort is normal.      Breath sounds: Normal breath sounds.   Abdominal:      Comments: Abdominal pain, bloating and nausea with loose yellow stools   Musculoskeletal:         General: Normal range of motion.   Skin:     General: Skin is warm.   Neurological:      General: No focal deficit present.      Mental Status: He is alert and oriented to person, place, and time.   Psychiatric:         Mood and Affect: Mood normal.         Behavior: Behavior normal.         Procedures    Assessment & Plan   Diagnoses and all orders for this visit:    1. History of stomach ulcers (Primary)  -     Cancel: Ambulatory Referral to General Surgery  -     Ambulatory Referral to General Surgery    2. Gastroesophageal reflux disease, unspecified whether  esophagitis present  -     pantoprazole (PROTONIX) 40 MG EC tablet; Take 1 tablet by mouth 2 (Two) Times a Day.  Dispense: 180 tablet; Refill: 1    3. Postprandial abdominal bloating  -     XR Abdomen KUB; Future    4. RUQ pain  -     Lipase; Future  -     US Abdomen Limited; Future  -     Lipase    Other orders  -     sucralfate (Carafate) 1 g tablet; Take 1 tablet by mouth 4 (Four) Times a Day.  Dispense: 120 tablet; Refill: 1           Current Outpatient Medications:     Blood Glucose Monitoring Suppl (Blood Glucose Monitor System) w/Device kit, Check blood sugars 2x day  Indications: relion meter and supplies   R73.03, Disp: 1 each, Rfl: 0    hydrOXYzine (ATARAX) 50 MG tablet, 1 tablet 1-3 times a day as needed for anxiety or insomnia, Disp: 90 tablet, Rfl: 0    levocetirizine (XYZAL) 5 MG tablet, Take 1 tablet by mouth Every Evening., Disp: 90 tablet, Rfl: 1    ondansetron (Zofran) 4 MG tablet, Take 1 tablet by mouth Every 8 (Eight) Hours As Needed for Nausea., Disp: 30 tablet, Rfl: 2    pantoprazole (PROTONIX) 40 MG EC tablet, Take 1 tablet by mouth 2 (Two) Times a Day., Disp: 180 tablet, Rfl: 1    PARoxetine (PAXIL) 20 MG tablet, TAKE 1 TABLET BY MOUTH DAILY, Disp: 90 tablet, Rfl: 1    Probiotic Product (Acidophilus High-Potency) capsule, Take 1 tablet by mouth Daily., Disp: 30 each, Rfl: 6    sucralfate (Carafate) 1 g tablet, Take 1 tablet by mouth 4 (Four) Times a Day., Disp: 120 tablet, Rfl: 1           Erin Harrison, APRN 4/7/2025 14:57 EDT  This note has been electronically signed

## 2025-04-07 NOTE — TELEPHONE ENCOUNTER
Caller: Raj Cabrera    Relationship: Self    Best call back number: [phone]     What is the best time to reach you: ANY    Who are you requesting to speak with (clinical staff, provider,  specific staff member): CLINICAL    Do you know the name of the person who called: EMILY    What was the call regarding: STOMACH PROBLEMS NOT ANY BETTER. PLEASE CALL TO DISCUSS    APPOINTMENT SCHEDULED

## 2025-04-08 LAB — LIPASE SERPL-CCNC: 21 U/L (ref 13–78)

## 2025-05-08 ENCOUNTER — OFFICE VISIT (OUTPATIENT)
Dept: FAMILY MEDICINE CLINIC | Facility: CLINIC | Age: 30
End: 2025-05-08
Payer: MEDICAID

## 2025-05-08 VITALS
DIASTOLIC BLOOD PRESSURE: 73 MMHG | HEIGHT: 70 IN | WEIGHT: 219 LBS | OXYGEN SATURATION: 96 % | SYSTOLIC BLOOD PRESSURE: 112 MMHG | BODY MASS INDEX: 31.35 KG/M2 | TEMPERATURE: 97.3 F | HEART RATE: 69 BPM

## 2025-05-08 DIAGNOSIS — R00.2 PALPITATIONS: ICD-10-CM

## 2025-05-08 DIAGNOSIS — Z72.0 NICOTINE ABUSE: ICD-10-CM

## 2025-05-08 DIAGNOSIS — Z87.11 HISTORY OF GASTRIC ULCER: ICD-10-CM

## 2025-05-08 DIAGNOSIS — E66.09 CLASS 1 OBESITY DUE TO EXCESS CALORIES WITH SERIOUS COMORBIDITY AND BODY MASS INDEX (BMI) OF 31.0 TO 31.9 IN ADULT: ICD-10-CM

## 2025-05-08 DIAGNOSIS — R53.83 OTHER FATIGUE: Primary | ICD-10-CM

## 2025-05-08 DIAGNOSIS — E03.9 HYPOTHYROIDISM, UNSPECIFIED TYPE: ICD-10-CM

## 2025-05-08 DIAGNOSIS — E66.811 CLASS 1 OBESITY DUE TO EXCESS CALORIES WITH SERIOUS COMORBIDITY AND BODY MASS INDEX (BMI) OF 31.0 TO 31.9 IN ADULT: ICD-10-CM

## 2025-05-08 DIAGNOSIS — K21.9 GASTROESOPHAGEAL REFLUX DISEASE WITHOUT ESOPHAGITIS: ICD-10-CM

## 2025-05-08 DIAGNOSIS — R73.03 PREDIABETES: ICD-10-CM

## 2025-05-08 PROCEDURE — 99213 OFFICE O/P EST LOW 20 MIN: CPT | Performed by: NURSE PRACTITIONER

## 2025-05-08 PROCEDURE — T1015 CLINIC SERVICE: HCPCS | Performed by: NURSE PRACTITIONER

## 2025-05-08 NOTE — PROGRESS NOTES
Raj Cabrera is a 30 y.o. male.     History of Present Illness  30-year-old white male who vapes nicotine with history of GERD, gastric ulcers, anxiety and insomnia who comes in today for follow-up visit    Blood pressure 112/72 heart rate 68 he denies any chest pain, dyspnea, tachycardia or dizziness    On last visit I treated him for GERD and he had a EGD.  Put him on Carafate and Protonix and his symptoms have resolved.  He is also cut out caffeine and is watching what he eats for his stomach    Patient states for the last for 5 days he has been very fatigued and wanting to sleep all the time.  He states he will be driving he will start getting dizzy feeling like a hot flash.  He has been dieting and skipping meals.  He is a prediabetic and I am thinking his sugars probably dropping on him.  He states the last 2 days he is gone back to eating normally and he has not had any more episodes.  I told him if he has any further episodes to check his blood sugar and his blood pressure write him down and let me know what they are doing.    Patient is also having tachycardia with exertion that will go on for 5 to 10 minutes after patient is at rest.  He states he can bend over to do something and stand up and his heart will race.  He was on a lot of caffeine, however he has stopped that but he still has had episodes of this.  Is not extremely frequent but certainly is not a normal finding.  Will refer him to cardiology to see for possible Holter monitor.    Will also check lab work today including Caro bar.  Weight is down 4 pounds to 219 for BMI 31.4.  She has had 1 COVID-vaccine still need to schedule an eye exam        Blood work today  Go back to eating normal meals  If any further episodes check blood sugar and blood pressure  Cardiology referral  Fatigue  Symptoms include fatigue.         The following portions of the patient's history were reviewed and updated as appropriate: allergies, current  "medications, past family history, past medical history, past social history, past surgical history, and problem list.    Vitals:    05/08/25 1043   BP: 112/73   BP Location: Right arm   Patient Position: Sitting   Cuff Size: Adult   Pulse: 69   Temp: 97.3 °F (36.3 °C)   TempSrc: Infrared   SpO2: 96%   Weight: 99.3 kg (219 lb)   Height: 177.8 cm (70\")       Past Medical History:   Diagnosis Date    Allergic     Year round allergies    Anxiety     GERD (gastroesophageal reflux disease)     Hyperlipidemia     IBS (irritable bowel syndrome)     Stomach ulcer     Tobacco abuse 10/12/2023     Past Surgical History:   Procedure Laterality Date    ENDOSCOPY      SINUS SURGERY       Family History   Problem Relation Age of Onset    Hypertension Mother     Cancer Mother         Bladder cancer (operable)    Heart disease Father      Immunization History   Administered Date(s) Administered    COVID-19 ([employer]) 05/08/2021    DTP 1994, 01/11/1996, 10/17/1996    DTaP 09/13/1999    Flu Vaccine Quad PF 6-35MO 01/07/2020    Hep B, Adolescent or Pediatric 1994, 1994, 01/11/1996    Hib (HbOC) 1994, 01/11/1996    Hib (PRP-T) 10/17/1996    MMR 01/11/1996, 09/13/1999    OPV 1994, 01/11/1996, 09/13/1999    Tdap 11/12/2017    Varicella 01/21/1998, 09/13/1999       Office Visit on 04/07/2025   Component Date Value Ref Range Status    Lipase 04/07/2025 21  13 - 78 U/L Final         Review of Systems   Constitutional: Negative.  Positive for fatigue.   HENT: Negative.     Respiratory: Negative.     Cardiovascular: Negative.  Positive for palpitations.   Gastrointestinal: Negative.    Genitourinary: Negative.    Musculoskeletal: Negative.    Skin: Negative.    Neurological: Negative.  Positive for dizziness.   Psychiatric/Behavioral: Negative.         Objective   Physical Exam  Constitutional:       Appearance: Normal appearance.   HENT:      Head: Normocephalic.   Cardiovascular:      Rate and Rhythm: Normal rate " and regular rhythm.      Pulses: Normal pulses.      Heart sounds: Normal heart sounds.   Pulmonary:      Effort: Pulmonary effort is normal.      Breath sounds: Normal breath sounds.   Abdominal:      General: Bowel sounds are normal.   Musculoskeletal:         General: Normal range of motion.   Skin:     General: Skin is warm.   Neurological:      General: No focal deficit present.      Mental Status: He is alert and oriented to person, place, and time.   Psychiatric:         Mood and Affect: Mood normal.         Behavior: Behavior normal.         Procedures    Assessment & Plan   Diagnoses and all orders for this visit:    1. Other fatigue (Primary)  -     Caro-Barr Virus VCA, IgM; Future  -     Caro-Barr Virus Nuclear Antigen Antibody, IgG; Future  -     CBC & Differential  -     Comprehensive Metabolic Panel    2. Palpitations  -     Ambulatory Referral to Cardiology    3. Prediabetes    4. Hypothyroidism, unspecified type  -     TSH+Free T4  -     T3           Current Outpatient Medications:     Blood Glucose Monitoring Suppl (Blood Glucose Monitor System) w/Device kit, Check blood sugars 2x day  Indications: relion meter and supplies   R73.03, Disp: 1 each, Rfl: 0    hydrOXYzine (ATARAX) 50 MG tablet, 1 tablet 1-3 times a day as needed for anxiety or insomnia, Disp: 90 tablet, Rfl: 0    levocetirizine (XYZAL) 5 MG tablet, Take 1 tablet by mouth Every Evening., Disp: 90 tablet, Rfl: 1    ondansetron (Zofran) 4 MG tablet, Take 1 tablet by mouth Every 8 (Eight) Hours As Needed for Nausea., Disp: 30 tablet, Rfl: 2    pantoprazole (PROTONIX) 40 MG EC tablet, Take 1 tablet by mouth 2 (Two) Times a Day., Disp: 180 tablet, Rfl: 1    PARoxetine (PAXIL) 20 MG tablet, TAKE 1 TABLET BY MOUTH DAILY, Disp: 90 tablet, Rfl: 1    Probiotic Product (Acidophilus High-Potency) capsule, Take 1 tablet by mouth Daily., Disp: 30 each, Rfl: 6    sucralfate (Carafate) 1 g tablet, Take 1 tablet by mouth 4 (Four) Times a Day.,  Disp: 120 tablet, Rfl: 1           Eirn Harrison, APRN 5/8/2025 11:20 EDT  This note has been electronically signed

## 2025-05-08 NOTE — PATIENT INSTRUCTIONS
Blood work today  Go back to eating normal meals  If any further episodes check blood sugar and blood pressure  Cardiology referral

## 2025-05-09 LAB
ALBUMIN SERPL-MCNC: 4.7 G/DL (ref 4.1–5.1)
ALP SERPL-CCNC: 64 IU/L (ref 44–121)
ALT SERPL-CCNC: 29 IU/L (ref 0–44)
AST SERPL-CCNC: 20 IU/L (ref 0–40)
BASOPHILS # BLD AUTO: 0 X10E3/UL (ref 0–0.2)
BASOPHILS NFR BLD AUTO: 0 %
BILIRUB SERPL-MCNC: 0.4 MG/DL (ref 0–1.2)
BUN SERPL-MCNC: 11 MG/DL (ref 6–20)
BUN/CREAT SERPL: 10 (ref 9–20)
CALCIUM SERPL-MCNC: 9.9 MG/DL (ref 8.7–10.2)
CHLORIDE SERPL-SCNC: 103 MMOL/L (ref 96–106)
CO2 SERPL-SCNC: 23 MMOL/L (ref 20–29)
CREAT SERPL-MCNC: 1.06 MG/DL (ref 0.76–1.27)
EGFRCR SERPLBLD CKD-EPI 2021: 96 ML/MIN/1.73
EOSINOPHIL # BLD AUTO: 0.1 X10E3/UL (ref 0–0.4)
EOSINOPHIL NFR BLD AUTO: 1 %
ERYTHROCYTE [DISTWIDTH] IN BLOOD BY AUTOMATED COUNT: 12.7 % (ref 11.6–15.4)
GLOBULIN SER CALC-MCNC: 2.2 G/DL (ref 1.5–4.5)
GLUCOSE SERPL-MCNC: 108 MG/DL (ref 70–99)
HCT VFR BLD AUTO: 52.3 % (ref 37.5–51)
HGB BLD-MCNC: 16.9 G/DL (ref 13–17.7)
IMM GRANULOCYTES # BLD AUTO: 0 X10E3/UL (ref 0–0.1)
IMM GRANULOCYTES NFR BLD AUTO: 0 %
LYMPHOCYTES # BLD AUTO: 1.5 X10E3/UL (ref 0.7–3.1)
LYMPHOCYTES NFR BLD AUTO: 31 %
MCH RBC QN AUTO: 30.1 PG (ref 26.6–33)
MCHC RBC AUTO-ENTMCNC: 32.3 G/DL (ref 31.5–35.7)
MCV RBC AUTO: 93 FL (ref 79–97)
MONOCYTES # BLD AUTO: 0.4 X10E3/UL (ref 0.1–0.9)
MONOCYTES NFR BLD AUTO: 9 %
NEUTROPHILS # BLD AUTO: 2.8 X10E3/UL (ref 1.4–7)
NEUTROPHILS NFR BLD AUTO: 59 %
PLATELET # BLD AUTO: 224 X10E3/UL (ref 150–450)
POTASSIUM SERPL-SCNC: 4.9 MMOL/L (ref 3.5–5.2)
PROT SERPL-MCNC: 6.9 G/DL (ref 6–8.5)
RBC # BLD AUTO: 5.61 X10E6/UL (ref 4.14–5.8)
SODIUM SERPL-SCNC: 143 MMOL/L (ref 134–144)
T3 SERPL-MCNC: 123 NG/DL (ref 71–180)
T4 FREE SERPL-MCNC: 1.39 NG/DL (ref 0.82–1.77)
TSH SERPL DL<=0.005 MIU/L-ACNC: 2.87 UIU/ML (ref 0.45–4.5)
WBC # BLD AUTO: 4.8 X10E3/UL (ref 3.4–10.8)

## 2025-05-20 NOTE — PROGRESS NOTES
Encounter Date:05/21/2025        Patient ID: Raj Cabrera is a 30 y.o. male.      Chief Complaint:      History of Present Illness  30 years old man with anxiety, GERD, gastric ulcers diagnosed by EGD presents to cardiology office to establish care.  He complains of palpitations, tachycardia with minimal activity and sometimes at rest.  Thyroid function test has been normal.    ECG shows normal sinus rhythm.  Heart rate 71, ND interval 180, QRS 96 and QTc 406 ms.    He is currently on no cardiac medications.  Today he complains of palpitations, shortness of breath, dizziness, lightheadedness.    The following portions of the patient's history were reviewed and updated as appropriate: allergies, current medications, past family history, past medical history, past social history, past surgical history, and problem list.    Review of Systems   Constitutional: Positive for malaise/fatigue.   Cardiovascular:  Positive for palpitations. Negative for chest pain, dyspnea on exertion and leg swelling.   Respiratory:  Positive for shortness of breath. Negative for cough.    Gastrointestinal:  Negative for abdominal pain, nausea and vomiting.   Neurological:  Positive for dizziness and light-headedness. Negative for headaches, numbness and weakness.   All other systems reviewed and are negative.        Current Outpatient Medications:     Blood Glucose Monitoring Suppl (Blood Glucose Monitor System) w/Device kit, Check blood sugars 2x day  Indications: relion meter and supplies   R73.03, Disp: 1 each, Rfl: 0    hydrOXYzine (ATARAX) 50 MG tablet, 1 tablet 1-3 times a day as needed for anxiety or insomnia, Disp: 90 tablet, Rfl: 0    levocetirizine (XYZAL) 5 MG tablet, Take 1 tablet by mouth Every Evening., Disp: 90 tablet, Rfl: 1    ondansetron (Zofran) 4 MG tablet, Take 1 tablet by mouth Every 8 (Eight) Hours As Needed for Nausea., Disp: 30 tablet, Rfl: 2    pantoprazole (PROTONIX) 40 MG EC tablet, Take 1 tablet by mouth  "2 (Two) Times a Day., Disp: 180 tablet, Rfl: 1    PARoxetine (PAXIL) 20 MG tablet, TAKE 1 TABLET BY MOUTH DAILY, Disp: 90 tablet, Rfl: 1    Probiotic Product (Acidophilus High-Potency) capsule, Take 1 tablet by mouth Daily., Disp: 30 each, Rfl: 6    sucralfate (Carafate) 1 g tablet, Take 1 tablet by mouth 4 (Four) Times a Day., Disp: 120 tablet, Rfl: 1    Current outpatient and discharge medications have been reconciled for the patient.  Reviewed by: Jose Eduardo Teran MD       No Known Allergies    Family History   Problem Relation Age of Onset    Hypertension Mother     Cancer Mother         Bladder cancer (operable)    Heart disease Father     Heart attack Father        Past Surgical History:   Procedure Laterality Date    ENDOSCOPY      SINUS SURGERY         Past Medical History:   Diagnosis Date    Allergic     Year round allergies    Anxiety     GERD (gastroesophageal reflux disease)     Hyperlipidemia     IBS (irritable bowel syndrome)     Stomach ulcer     Tobacco abuse 10/12/2023       Family History   Problem Relation Age of Onset    Hypertension Mother     Cancer Mother         Bladder cancer (operable)    Heart disease Father     Heart attack Father        Social History     Socioeconomic History    Marital status:    Tobacco Use    Smoking status: Former     Current packs/day: 0.00     Average packs/day: 1 pack/day for 2.0 years (2.0 ttl pk-yrs)     Types: Cigarettes     Start date: 2021     Quit date: 2023     Years since quittin.3     Passive exposure: Never    Smokeless tobacco: Never   Vaping Use    Vaping status: Every Day    Substances: Nicotine, Flavoring    Devices: Refillable tank   Substance and Sexual Activity    Alcohol use: Never    Drug use: Never    Sexual activity: Yes     Partners: Female               Objective:       Physical Exam    /72 (BP Location: Left arm, Patient Position: Sitting, Cuff Size: Adult)   Pulse 79   Ht 177.8 cm (70\")   Wt 101 kg (222 lb)   " SpO2 97%   BMI 31.85 kg/m²   The patient is alert, oriented and in no distress.    Vital signs as noted above.    Head and neck revealed no carotid bruits or jugular venous distension.  No thyromegaly or lymphadenopathy is present.    Lungs clear.  No wheezing.  Breath sounds are normal bilaterally.    Heart normal first and second heart sounds.  No murmur..  No pericardial rub is present.  No gallop is present.    Abdomen soft and nontender.  No organomegaly is present.    Extremities revealed good peripheral pulses without any pedal edema.    Skin warm and dry.    Musculoskeletal system is grossly normal.    CNS grossly normal.           Diagnosis Plan   1. Palpitations  Holter Monitor - 72 Hour Up To 15 Days      2. Anxiety        3. Gastroesophageal reflux disease without esophagitis        4. Class 1 obesity due to excess calories without serious comorbidity with body mass index (BMI) of 31.0 to 31.9 in adult        5. Encounter for preventive care        6. Vaping nicotine dependence, non-tobacco product        LAB RESULTS (LAST 7 DAYS)    CBC        BMP        CMP         BNP        TROPONIN        CoAg        Creatinine Clearance  Estimated Creatinine Clearance: 120.3 mL/min (by C-G formula based on SCr of 1.06 mg/dL).    ABG        Radiology  No radiology results for the last day    EKG  Procedures    Stress test      Echocardiogram      Cardiac catheterization  No results found for this or any previous visit.          Assessment and Plan       Diagnoses and all orders for this visit:    1. Palpitations (Primary)/shortness of breath, lightheadedness, dizziness, presyncope  Discussed possibility of sinus tachycardia, PACs, PVCs or less likely atrial fibrillation.  Obtain a 14 days Holter monitor.  Obtain an echocardiogram to rule out structural abnormalities in the heart.  Encourage hydration.  Encouraged to cut back on caffeine and stimulant energy drinks.  2. Anxiety  Continue hydroxyzine and  paroxetine.  3. Gastroesophageal reflux disease without esophagitis  PPI as needed  4. Class 1 obesity due to excess calories without serious comorbidity with body mass index (BMI) of 31.0 to 31.9 in adult  BMI is 32.  He weighs 222 pounds.  Lifestyle modifications discussed with the patient  Screening and treatment for sleep apnea  5. Encounter for preventive care  Discussed healthy cardiovascular habits.  Recommended quitting vaping.  Patient quit smoking and is currently tapering his vape use.

## 2025-05-21 ENCOUNTER — OFFICE VISIT (OUTPATIENT)
Dept: CARDIOLOGY | Facility: CLINIC | Age: 30
End: 2025-05-21
Payer: MEDICAID

## 2025-05-21 VITALS
DIASTOLIC BLOOD PRESSURE: 72 MMHG | OXYGEN SATURATION: 97 % | HEART RATE: 79 BPM | SYSTOLIC BLOOD PRESSURE: 131 MMHG | WEIGHT: 222 LBS | BODY MASS INDEX: 31.78 KG/M2 | HEIGHT: 70 IN

## 2025-05-21 DIAGNOSIS — F17.200 VAPING NICOTINE DEPENDENCE, NON-TOBACCO PRODUCT: ICD-10-CM

## 2025-05-21 DIAGNOSIS — Z00.00 ENCOUNTER FOR PREVENTIVE CARE: ICD-10-CM

## 2025-05-21 DIAGNOSIS — R00.2 PALPITATIONS: Primary | ICD-10-CM

## 2025-05-21 DIAGNOSIS — K21.9 GASTROESOPHAGEAL REFLUX DISEASE WITHOUT ESOPHAGITIS: ICD-10-CM

## 2025-05-21 DIAGNOSIS — E66.09 CLASS 1 OBESITY DUE TO EXCESS CALORIES WITHOUT SERIOUS COMORBIDITY WITH BODY MASS INDEX (BMI) OF 31.0 TO 31.9 IN ADULT: ICD-10-CM

## 2025-05-21 DIAGNOSIS — E66.811 CLASS 1 OBESITY DUE TO EXCESS CALORIES WITHOUT SERIOUS COMORBIDITY WITH BODY MASS INDEX (BMI) OF 31.0 TO 31.9 IN ADULT: ICD-10-CM

## 2025-05-21 DIAGNOSIS — F41.9 ANXIETY: ICD-10-CM

## 2025-06-09 ENCOUNTER — OFFICE VISIT (OUTPATIENT)
Dept: FAMILY MEDICINE CLINIC | Facility: CLINIC | Age: 30
End: 2025-06-09
Payer: MEDICAID

## 2025-06-09 VITALS
RESPIRATION RATE: 16 BRPM | WEIGHT: 219.8 LBS | BODY MASS INDEX: 31.47 KG/M2 | SYSTOLIC BLOOD PRESSURE: 118 MMHG | HEART RATE: 69 BPM | DIASTOLIC BLOOD PRESSURE: 75 MMHG | HEIGHT: 70 IN | TEMPERATURE: 97.1 F | OXYGEN SATURATION: 97 %

## 2025-06-09 DIAGNOSIS — R00.0 TACHYCARDIA: ICD-10-CM

## 2025-06-09 DIAGNOSIS — Z72.0 NICOTINE ABUSE: ICD-10-CM

## 2025-06-09 DIAGNOSIS — E66.811 CLASS 1 OBESITY DUE TO EXCESS CALORIES WITH SERIOUS COMORBIDITY AND BODY MASS INDEX (BMI) OF 31.0 TO 31.9 IN ADULT: ICD-10-CM

## 2025-06-09 DIAGNOSIS — E78.2 MIXED HYPERLIPIDEMIA: Primary | ICD-10-CM

## 2025-06-09 DIAGNOSIS — F41.9 ANXIETY: ICD-10-CM

## 2025-06-09 DIAGNOSIS — E66.09 CLASS 1 OBESITY DUE TO EXCESS CALORIES WITH SERIOUS COMORBIDITY AND BODY MASS INDEX (BMI) OF 31.0 TO 31.9 IN ADULT: ICD-10-CM

## 2025-06-09 DIAGNOSIS — R73.03 PREDIABETES: ICD-10-CM

## 2025-06-09 PROCEDURE — T1015 CLINIC SERVICE: HCPCS | Performed by: NURSE PRACTITIONER

## 2025-06-09 PROCEDURE — 99213 OFFICE O/P EST LOW 20 MIN: CPT | Performed by: NURSE PRACTITIONER

## 2025-06-09 RX ORDER — PAROXETINE 20 MG/1
20 TABLET, FILM COATED ORAL DAILY
Qty: 90 TABLET | Refills: 1 | Status: SHIPPED | OUTPATIENT
Start: 2025-06-09

## 2025-06-09 NOTE — PATIENT INSTRUCTIONS
Blood work today  Will get results of 2D echo for chart  Back off on foods that are white try to lose more weight

## 2025-06-09 NOTE — PROGRESS NOTES
"    Raj Cabrera is a 30 y.o. male.     History of Present Illness  30-year-old white male who vapes nicotine with history of GERD, gastric ulcers, anxiety and insomnia who comes in today for follow-up visit    Blood pressure 118/74 heart rate 68 he denies any chest pain, dyspnea, tachycardia or dizziness.  Patient was having episodes of tachycardia referred him to Dr. Teran.  He had a 2D echo EKG and Holter monitor.  Patient states he had a 2D echo done at [place] but is not in the chart we will call and try to get that sent.  The Holter monitor apparently has not been read yet by cardiology but the results are not in the chart.  He states that his tachycardia has gotten a lot better with diet changes    His weight is 220 with a BMI of 31.5 and encouraged him to try to on carbs which is the main reason he is not losing weight..  Weight loss is important for this patient since his lipid panel is absolutely horrific and he does not tolerate statins.  Will be rechecking fasting lipid panel.  If elevated we will probably send him back to Dr. Teran for possible Repatha injection    He has had 1 COVID-vaccine and still needs to schedule an eye exam          Blood work today  Will get results of 2D echo for chart  Back off on foods that are white try to lose more weight  Anxiety       The following portions of the patient's history were reviewed and updated as appropriate: current medications, past family history, past medical history, past social history, past surgical history, and problem list.    Vitals:    06/09/25 0936   BP: 118/75   BP Location: Right arm   Patient Position: Sitting   Cuff Size: Adult   Pulse: 69   Resp: 16   Temp: 97.1 °F (36.2 °C)   SpO2: 97%   Weight: 99.7 kg (219 lb 12.8 oz)   Height: 177.8 cm (70\")       Past Medical History:   Diagnosis Date    Allergic     Year round allergies    Anxiety     GERD (gastroesophageal reflux disease)     Hyperlipidemia     IBS (irritable bowel syndrome)     " Stomach ulcer     Tobacco abuse 10/12/2023     Past Surgical History:   Procedure Laterality Date    ENDOSCOPY      SINUS SURGERY       Family History   Problem Relation Age of Onset    Hypertension Mother     Cancer Mother         Bladder cancer (operable)    Heart disease Father     Heart attack Father      Immunization History   Administered Date(s) Administered    COVID-19 ([facility]) 05/08/2021    DTP 1994, 01/11/1996, 10/17/1996    DTaP 09/13/1999    Flu Vaccine Quad PF 6-35MO 01/07/2020    Hep B, Adolescent or Pediatric 1994, 1994, 01/11/1996    Hib (HbOC) 1994, 01/11/1996    Hib (PRP-T) 10/17/1996    MMR 01/11/1996, 09/13/1999    OPV 1994, 01/11/1996, 09/13/1999    Tdap 11/12/2017    Varicella 01/21/1998, 09/13/1999       Office Visit on 05/08/2025   Component Date Value Ref Range Status    WBC 05/08/2025 4.8  3.4 - 10.8 x10E3/uL Final    RBC 05/08/2025 5.61  4.14 - 5.80 x10E6/uL Final    Hemoglobin 05/08/2025 16.9  13.0 - 17.7 g/dL Final    Hematocrit 05/08/2025 52.3 (H)  37.5 - 51.0 % Final    MCV 05/08/2025 93  79 - 97 fL Final    MCH 05/08/2025 30.1  26.6 - 33.0 pg Final    MCHC 05/08/2025 32.3  31.5 - 35.7 g/dL Final    RDW 05/08/2025 12.7  11.6 - 15.4 % Final    Platelets 05/08/2025 224  150 - 450 x10E3/uL Final    Neutrophil Rel % 05/08/2025 59  Not Estab. % Final    Lymphocyte Rel % 05/08/2025 31  Not Estab. % Final    Monocyte Rel % 05/08/2025 9  Not Estab. % Final    Eosinophil Rel % 05/08/2025 1  Not Estab. % Final    Basophil Rel % 05/08/2025 0  Not Estab. % Final    Neutrophils Absolute 05/08/2025 2.8  1.4 - 7.0 x10E3/uL Final    Lymphocytes Absolute 05/08/2025 1.5  0.7 - 3.1 x10E3/uL Final    Monocytes Absolute 05/08/2025 0.4  0.1 - 0.9 x10E3/uL Final    Eosinophils Absolute 05/08/2025 0.1  0.0 - 0.4 x10E3/uL Final    Basophils Absolute 05/08/2025 0.0  0.0 - 0.2 x10E3/uL Final    Immature Granulocyte Rel % 05/08/2025 0  Not Estab. % Final    Immature Grans Absolute  05/08/2025 0.0  0.0 - 0.1 x10E3/uL Final    Glucose 05/08/2025 108 (H)  70 - 99 mg/dL Final    BUN 05/08/2025 11  6 - 20 mg/dL Final    Creatinine 05/08/2025 1.06  0.76 - 1.27 mg/dL Final    EGFR Result 05/08/2025 96  >59 mL/min/1.73 Final    BUN/Creatinine Ratio 05/08/2025 10  9 - 20 Final    Sodium 05/08/2025 143  134 - 144 mmol/L Final    Potassium 05/08/2025 4.9  3.5 - 5.2 mmol/L Final    Chloride 05/08/2025 103  96 - 106 mmol/L Final    Total CO2 05/08/2025 23  20 - 29 mmol/L Final    Calcium 05/08/2025 9.9  8.7 - 10.2 mg/dL Final    Total Protein 05/08/2025 6.9  6.0 - 8.5 g/dL Final    Albumin 05/08/2025 4.7  4.1 - 5.1 g/dL Final    Globulin 05/08/2025 2.2  1.5 - 4.5 g/dL Final    Total Bilirubin 05/08/2025 0.4  0.0 - 1.2 mg/dL Final    Alkaline Phosphatase 05/08/2025 64  44 - 121 IU/L Final    AST (SGOT) 05/08/2025 20  0 - 40 IU/L Final    ALT (SGPT) 05/08/2025 29  0 - 44 IU/L Final    TSH 05/08/2025 2.870  0.450 - 4.500 uIU/mL Final    Free T4 05/08/2025 1.39  0.82 - 1.77 ng/dL Final    T3, Total 05/08/2025 123  71 - 180 ng/dL Final         Review of Systems   Constitutional: Negative.    HENT: Negative.     Respiratory: Negative.     Cardiovascular: Negative.    Gastrointestinal: Negative.    Genitourinary: Negative.    Musculoskeletal: Negative.    Skin: Negative.    Neurological: Negative.    Psychiatric/Behavioral: Negative.         Objective   Physical Exam  Constitutional:       Appearance: Normal appearance.   HENT:      Head: Normocephalic.   Cardiovascular:      Rate and Rhythm: Normal rate and regular rhythm.      Pulses: Normal pulses.      Heart sounds: Normal heart sounds.   Pulmonary:      Effort: Pulmonary effort is normal.      Breath sounds: Normal breath sounds.   Abdominal:      General: Bowel sounds are normal.   Musculoskeletal:         General: Normal range of motion.   Skin:     General: Skin is warm.   Neurological:      General: No focal deficit present.      Mental Status: He is  alert and oriented to person, place, and time.   Psychiatric:         Mood and Affect: Mood normal.         Behavior: Behavior normal.         Procedures    Assessment & Plan   Diagnoses and all orders for this visit:    1. Mixed hyperlipidemia (Primary)  -     Lipid Panel With LDL / HDL Ratio    2. Prediabetes  -     Comprehensive Metabolic Panel  -     Hemoglobin A1c    3. Anxiety  -     PARoxetine (PAXIL) 20 MG tablet; Take 1 tablet by mouth Daily.  Dispense: 90 tablet; Refill: 1           Current Outpatient Medications:     PARoxetine (PAXIL) 20 MG tablet, Take 1 tablet by mouth Daily., Disp: 90 tablet, Rfl: 1    Blood Glucose Monitoring Suppl (Blood Glucose Monitor System) w/Device kit, Check blood sugars 2x day  Indications: relion meter and supplies   R73.03, Disp: 1 each, Rfl: 0    hydrOXYzine (ATARAX) 50 MG tablet, 1 tablet 1-3 times a day as needed for anxiety or insomnia, Disp: 90 tablet, Rfl: 0    levocetirizine (XYZAL) 5 MG tablet, Take 1 tablet by mouth Every Evening., Disp: 90 tablet, Rfl: 1    ondansetron (Zofran) 4 MG tablet, Take 1 tablet by mouth Every 8 (Eight) Hours As Needed for Nausea., Disp: 30 tablet, Rfl: 2    pantoprazole (PROTONIX) 40 MG EC tablet, Take 1 tablet by mouth 2 (Two) Times a Day., Disp: 180 tablet, Rfl: 1    Probiotic Product (Acidophilus High-Potency) capsule, Take 1 tablet by mouth Daily., Disp: 30 each, Rfl: 6    sucralfate (Carafate) 1 g tablet, Take 1 tablet by mouth 4 (Four) Times a Day., Disp: 120 tablet, Rfl: 1           MARISOL Yates 6/9/2025 10:36 EDT  This note has been electronically signed

## 2025-06-10 LAB
ALBUMIN SERPL-MCNC: 4.4 G/DL (ref 4.1–5.1)
ALP SERPL-CCNC: 67 IU/L (ref 44–121)
ALT SERPL-CCNC: 19 IU/L (ref 0–44)
AST SERPL-CCNC: 15 IU/L (ref 0–40)
BILIRUB SERPL-MCNC: 0.4 MG/DL (ref 0–1.2)
BUN SERPL-MCNC: 13 MG/DL (ref 6–20)
BUN/CREAT SERPL: 12 (ref 9–20)
CALCIUM SERPL-MCNC: 9.2 MG/DL (ref 8.7–10.2)
CHLORIDE SERPL-SCNC: 105 MMOL/L (ref 96–106)
CHOLEST SERPL-MCNC: 176 MG/DL (ref 100–199)
CO2 SERPL-SCNC: 24 MMOL/L (ref 20–29)
CREAT SERPL-MCNC: 1.12 MG/DL (ref 0.76–1.27)
EGFRCR SERPLBLD CKD-EPI 2021: 90 ML/MIN/1.73
GLOBULIN SER CALC-MCNC: 2.2 G/DL (ref 1.5–4.5)
GLUCOSE SERPL-MCNC: 110 MG/DL (ref 70–99)
HBA1C MFR BLD: 5.7 % (ref 4.8–5.6)
HDLC SERPL-MCNC: 32 MG/DL
LDLC SERPL CALC-MCNC: 125 MG/DL (ref 0–99)
LDLC/HDLC SERPL: 3.9 RATIO (ref 0–3.6)
POTASSIUM SERPL-SCNC: 4.5 MMOL/L (ref 3.5–5.2)
PROT SERPL-MCNC: 6.6 G/DL (ref 6–8.5)
SODIUM SERPL-SCNC: 143 MMOL/L (ref 134–144)
TRIGL SERPL-MCNC: 100 MG/DL (ref 0–149)
VLDLC SERPL CALC-MCNC: 19 MG/DL (ref 5–40)

## 2025-06-16 DIAGNOSIS — J30.1 ALLERGIC RHINITIS DUE TO POLLEN, UNSPECIFIED SEASONALITY: ICD-10-CM

## 2025-06-16 RX ORDER — LEVOCETIRIZINE DIHYDROCHLORIDE 5 MG/1
5 TABLET, FILM COATED ORAL EVERY EVENING
Qty: 90 TABLET | Refills: 1 | Status: SHIPPED | OUTPATIENT
Start: 2025-06-16

## 2025-06-16 RX ORDER — HYDROXYZINE HYDROCHLORIDE 50 MG/1
TABLET, FILM COATED ORAL
Qty: 90 TABLET | Refills: 0 | Status: SHIPPED | OUTPATIENT
Start: 2025-06-16

## 2025-06-18 ENCOUNTER — OUTSIDE FACILITY SERVICE (OUTPATIENT)
Dept: CARDIOLOGY | Facility: CLINIC | Age: 30
End: 2025-06-18
Payer: OTHER GOVERNMENT

## 2025-08-20 ENCOUNTER — OFFICE VISIT (OUTPATIENT)
Dept: CARDIOLOGY | Facility: CLINIC | Age: 30
End: 2025-08-20
Payer: OTHER GOVERNMENT

## 2025-08-20 VITALS
BODY MASS INDEX: 30.49 KG/M2 | SYSTOLIC BLOOD PRESSURE: 110 MMHG | WEIGHT: 213 LBS | HEIGHT: 70 IN | HEART RATE: 64 BPM | DIASTOLIC BLOOD PRESSURE: 79 MMHG | OXYGEN SATURATION: 96 %

## 2025-08-20 DIAGNOSIS — R00.2 PALPITATIONS: Primary | ICD-10-CM

## 2025-08-20 DIAGNOSIS — Z00.00 ENCOUNTER FOR PREVENTIVE CARE: ICD-10-CM

## 2025-08-20 DIAGNOSIS — E66.09 CLASS 1 OBESITY DUE TO EXCESS CALORIES WITHOUT SERIOUS COMORBIDITY WITH BODY MASS INDEX (BMI) OF 31.0 TO 31.9 IN ADULT: ICD-10-CM

## 2025-08-20 DIAGNOSIS — F17.200 VAPING NICOTINE DEPENDENCE, NON-TOBACCO PRODUCT: ICD-10-CM

## 2025-08-20 DIAGNOSIS — E66.811 CLASS 1 OBESITY DUE TO EXCESS CALORIES WITHOUT SERIOUS COMORBIDITY WITH BODY MASS INDEX (BMI) OF 31.0 TO 31.9 IN ADULT: ICD-10-CM

## 2025-08-20 DIAGNOSIS — F41.9 ANXIETY: ICD-10-CM

## 2025-08-20 DIAGNOSIS — K21.9 GASTROESOPHAGEAL REFLUX DISEASE WITHOUT ESOPHAGITIS: ICD-10-CM
